# Patient Record
Sex: FEMALE | Race: WHITE | NOT HISPANIC OR LATINO | Employment: OTHER | URBAN - METROPOLITAN AREA
[De-identification: names, ages, dates, MRNs, and addresses within clinical notes are randomized per-mention and may not be internally consistent; named-entity substitution may affect disease eponyms.]

---

## 2017-05-12 ENCOUNTER — HOSPITAL ENCOUNTER (EMERGENCY)
Facility: OTHER | Age: 67
Discharge: HOME | End: 2017-05-12
Attending: EMERGENCY MEDICINE
Payer: COMMERCIAL

## 2017-05-12 VITALS
OXYGEN SATURATION: 90 % | HEART RATE: 77 BPM | DIASTOLIC BLOOD PRESSURE: 69 MMHG | BODY MASS INDEX: 29.23 KG/M2 | HEIGHT: 63 IN | SYSTOLIC BLOOD PRESSURE: 141 MMHG | WEIGHT: 165 LBS | RESPIRATION RATE: 18 BRPM

## 2017-05-12 DIAGNOSIS — L03.115 CELLULITIS OF RIGHT LOWER EXTREMITY: ICD-10-CM
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
## 2017-05-12 LAB — POCT GLUCOSE: 168 MG/DL (ref 70–110)

## 2017-05-12 PROCEDURE — 99284 EMERGENCY DEPT VISIT MOD MDM: CPT

## 2017-05-12 RX ORDER — MUPIROCIN 20 MG/G
OINTMENT TOPICAL 3 TIMES DAILY
Qty: 1 TUBE | Refills: 0 | Status: SHIPPED | OUTPATIENT
Start: 2017-05-12 | End: 2017-05-22

## 2017-05-12 RX ORDER — IBUPROFEN 600 MG/1
600 TABLET ORAL EVERY 6 HOURS PRN
Qty: 20 TABLET | Refills: 0 | Status: SHIPPED | OUTPATIENT
Start: 2017-05-12 | End: 2019-07-23

## 2017-05-12 RX ORDER — DIPHENHYDRAMINE HCL 25 MG
25 CAPSULE ORAL EVERY 6 HOURS PRN
Qty: 20 CAPSULE | Refills: 0 | Status: SHIPPED | OUTPATIENT
Start: 2017-05-12 | End: 2020-07-17

## 2017-05-12 RX ORDER — CLINDAMYCIN HYDROCHLORIDE 150 MG/1
300 CAPSULE ORAL EVERY 6 HOURS
Qty: 80 CAPSULE | Refills: 0 | Status: SHIPPED | OUTPATIENT
Start: 2017-05-12 | End: 2017-05-22

## 2017-05-12 NOTE — ED AVS SNAPSHOT
[facility] EMERGENCY DEPARTMENT  [address]  [facility] [address]               Aleksandra Hagen   2017  5:36 PM   ED    Description:  Female : 1950   Department:  [facility] Emergency Department           Your Care was Coordinated By:     Provider Role From To    Sydni Jacob DO Attending Provider 17 [phone] --      Reason for Visit     Insect Bite           Diagnoses this Visit        Comments    Insect bite, initial encounter    -  Primary     Cellulitis of right lower extremity           ED Disposition     ED Disposition Condition Comment    Discharge             To Do List           Follow-up Information     Follow up with Akbar Meier NP. Schedule an appointment as soon as possible for a visit in 2 days.    Specialty:  Internal Medicine    Contact information:    [address] [facility]  [facility] [address]  [phone]          Follow up with [facility] Emergency Department.    Specialty:  Emergency Medicine    Why:  If symptoms worsen    Contact information:    [address]  [facility] [address]  [phone]       These Medications        Disp Refills Start End    clindamycin (CLEOCIN) 150 MG capsule 80 capsule 0 2017    Take 2 capsules (300 mg total) by mouth every 6 (six) hours. - Oral    Pharmacy: [facility] [address] Ph #: [phone]       mupirocin (BACTROBAN) 2 % ointment 1 Tube 0 2017    Apply topically 3 (three) times daily. - Topical (Top)    Pharmacy: [facility] - [facility] Nahomi Ward [address] Ph #: [phone]       ibuprofen (ADVIL,MOTRIN) 600 MG tablet 20 tablet 0 2017     Take 1 tablet (600 mg total) by mouth every 6 (six) hours as needed for Pain. - Oral    Pharmacy: [facility] Nahomi Ward, [address] Ph #: [phone]       diphenhydrAMINE (BENADRYL) 25 mg capsule 20 capsule 0 2017     Take 1 each (25 mg  total) by mouth every 6 (six) hours as needed for Itching or Allergies. - Oral    Pharmacy: [facility] - [place], [place] - [address] [facility] #: [phone]         [facility] On Call     [facility] On Call Nurse Care Line - 24/7 Assistance  Unless otherwise directed by your provider, please contact [facility], our nurse care line that is available for 24/7 assistance.     Registered nurses in the [facility] provide: appointment scheduling, clinical advisement, health education, and other advisory services.  Call: [phone] (toll free)               Medications           Message regarding Medications     Verify the changes and/or additions to your medication regime listed below are the same as discussed with your clinician today.  If any of these changes or additions are incorrect, please notify your healthcare provider.        START taking these NEW medications        Refills    clindamycin (CLEOCIN) 150 MG capsule 0    Sig: Take 2 capsules (300 mg total) by mouth every 6 (six) hours.    Class: Print    Route: Oral    mupirocin (BACTROBAN) 2 % ointment 0    Sig: Apply topically 3 (three) times daily.    Class: Print    Route: Topical (Top)    ibuprofen (ADVIL,MOTRIN) 600 MG tablet 0    Sig: Take 1 tablet (600 mg total) by mouth every 6 (six) hours as needed for Pain.    Class: Print    Route: Oral    diphenhydrAMINE (BENADRYL) 25 mg capsule 0    Sig: Take 1 each (25 mg total) by mouth every 6 (six) hours as needed for Itching or Allergies.    Class: Print    Route: Oral      STOP taking these medications     omeprazole (PRILOSEC) 40 MG capsule Take 40 mg by mouth once daily.           Verify that the below list of medications is an accurate representation of the medications you are currently taking.  If none reported, the list may be blank. If incorrect, please contact your healthcare provider. Carry this list with you in case of emergency.           Current Medications      amlodipine (NORVASC) 5 MG tablet Take 5 mg by mouth once daily.    calcium carbonate (OS-ALISSA) 600 mg (1,500 mg) Tab Take 600 mg by mouth once daily.    citalopram (CELEXA) 40 MG tablet Take 40 mg by mouth once daily.    cyanocobalamin, vitamin B-12, 2,500 mcg Subl Place under the tongue.    fenofibrate 160 MG Tab Take 160 mg by mouth once daily.    furosemide (LASIX) 40 MG tablet Take 40 mg by mouth once daily.    gabapentin (NEURONTIN) 600 MG tablet     HUMALOG 100 unit/mL injection     metoprolol succinate (TOPROL-XL) 50 MG 24 hr tablet Take 50 mg by mouth 2 (two) times daily.    multivitamin capsule Take 1 capsule by mouth once daily.    ONETOUCH ULTRA TEST Strp     potassium gluconate 595 mg (99 mg) Tab Take by mouth once daily.    pravastatin (PRAVACHOL) 40 MG tablet Take 40 mg by mouth once daily.    ramipril (ALTACE) 5 MG capsule Take 5 mg by mouth once daily.    vitamin D 1000 units Tab Take 2,000 Units by mouth 2 (two) times daily.    albuterol 90 mcg/actuation inhaler Inhale 2 puffs into the lungs every 6 (six) hours as needed for Wheezing.    beclomethasone (QVAR) 80 mcg/actuation Aero Inhale 1 puff into the lungs 2 (two) times daily. Rinse mouth after each use.    clindamycin (CLEOCIN) 150 MG capsule Take 2 capsules (300 mg total) by mouth every 6 (six) hours.    colestipol (COLESTID) 1 gram Tab Take 1 g by mouth 2 (two) times daily.    diphenhydrAMINE (BENADRYL) 25 mg capsule Take 1 each (25 mg total) by mouth every 6 (six) hours as needed for Itching or Allergies.    fexofenadine (ALLEGRA) 180 MG tablet Take 1 tablet (180 mg total) by mouth once daily.    fluticasone (FLONASE) 50 mcg/actuation nasal spray 1 spray by Each Nare route once daily.    ibuprofen (ADVIL,MOTRIN) 600 MG tablet Take 1 tablet (600 mg total) by mouth every 6 (six) hours as needed for Pain.    mupirocin (BACTROBAN) 2 % ointment Apply topically 3 (three) times daily.           Clinical Reference Information           Your Vitals  "Were     BP Pulse Resp Height Weight SpO2    141/69 (BP Location: Left arm, Patient Position: Sitting) 77 18 5' 3" (1.6 m) 74.8 kg (165 lb) 90%    BMI                29.23 kg/m2          Allergies as of 5/12/2017        Reactions    Codeine Itching    burning      Immunizations Administered on Date of Encounter - 5/12/2017     None      ED Micro, Lab, POCT     None      ED Imaging Orders     None        Discharge Instructions         Insect, Spider, and Scorpion Bites and Stings  Most insect bites are harmless and cause only minor swelling or itching. But if youre allergic to insects such as wasps or bees, a sting can cause a life-threatening allergic reaction. The venom (poison) from scorpions and certain spiders can also be deadly, although this is rare. Knowing when to seek emergency care could save your life.     The black  (top) and brown recluse (bottom) are two poisonous spiders found in the [place].   When to go to the emergency room (ER)  Call 911 right away for any:  · Scorpion sting  · Bite from a black, red, or brown  spider or brown recluse spider  · Signs of an allergic reaction such as:  ¨ Hives  ¨ Swelling of your eyes, lips, or the inside of your throat  ¨ Trouble breathing  ¨ Dizziness or confusion  What to expect in the ER  · If youre having trouble breathing, youll be given oxygen through a mask. In case of severe breathing difficulty, you may have a tube inserted in your throat and be placed on a ventilator (breathing machine).  · If you are having a severe allergic reaction from a sting (called anaphylaxis), you may be given a shot of epinephrine. If it is known that you are allergic to bee or wasp stings, your doctor may give you a prescription for an "epi-pen" that you can keep with you at all times in case of a sting.  · You may receive antivenin (a substance that reverses the effects of poison) for some spider bites and scorpion stings. Because antivenin can sometimes " cause other problems, your doctor will weigh the risks and benefits of this treatment.  · Steroids such as prednisone are often used to treat allergic reactions. In many cases, your doctor will prescribe an antihistamine to help relieve itching.  Easing symptoms of an insect bite or sting  · Try to remove a stinger you can see. Use your fingernail, a knife edge, or credit card to scrape against the skin. Do not squeeze or pull.  · Apply ice or a cold compress to reduce pain and swelling (keep a thin cloth between the cold source and the skin).   Date Last Reviewed: 11/20/2014 © 2000-2016 [employer]. [address] [place] [address], [place] [address]. All rights reserved. This information is not intended as a substitute for professional medical care. Always follow your healthcare professional's instructions.          Discharge Instructions for Cellulitis  You have been diagnosed with cellulitis. This is an infection in the deepest layer of the skin. In some cases, the infection also affects the muscle. Cellulitis is caused by bacteria. The bacteria can enter the body through broken skin. This can happen with a cut, scratch, animal bite, or an insect bite that has been scratched. You may have been treated in the hospital with antibiotics and fluids. You will likely be given a prescription for antibiotics to take at home. This sheet will help you take care of yourself at home.  Home care  When you are home:  · Take the prescribed antibiotic medicine you are given as directed until it is gone. Take it even if you feel better. It treats the infection and stops it from returning. Not taking all the medicine can make future infections hard to treat.  · Keep the infected area clean.  · When possible, raise the infected area above the level of your heart. This helps keep swelling down.  · Talk with your healthcare provider if you are in pain. Ask what kind of over-the-counter medicine you can take for pain.  · Apply  clean bandages as advised.  · Take your temperature once a day for a week.  · Wash your hands often to prevent spreading the infection.  In the future, wash your hands before and after you touch cuts, scratches, or bandages. This will help prevent infection.   When to call your healthcare provider  Call your healthcare provider immediately if you have any of the following:  · Difficulty or pain when moving the joints above or below the infected area  · Discharge or pus draining from the area  · Fever of 100.4°F (38°C) or higher, or as directed by your healthcare provider  · Pain that gets worse in or around the infected   · Redness that gets worse in or around the infected area, particularly if the area of redness expands to a wider area  · Shaking chills  · Swelling of the infected area  · Vomiting   Date Last Reviewed: 8/1/2016  © 3790-9746 [employer]. [address] [place] [address]. All rights reserved. This information is not intended as a substitute for professional medical care. Always follow your healthcare professional's instructions.          [facility] Sign-Up     Activating your [facility] account is as easy as 1-2-3!     1) Visit [email or web address], select Sign Up Now, enter this activation code and your date of birth, then select Next.  [email or web address]  Expires: 6/26/2017  6:00 PM      2) Create a username and password to use when you visit [facility] in the future and select a security question in case you lose your password and select Next.    3) Enter your e-mail address and click Sign Up!    Additional Information  If you have questions, please e-mail [email or web address] or call [phone] to talk to our [facility] staff. Remember, [facility] is NOT to be used for urgent needs. For medical emergencies, dial 911.          [facility] Emergency Department complies with applicable Federal civil rights laws and does not discriminate on the basis of race, color, national origin,  age, disability, or sex.        Language Assistance Services     ATTENTION: Language assistance services are available, free of charge. Please call [phone].      ATENCIÓN: Si habla sarahañol, tiene a green disposición servicios gratuitos de asistencia lingüística. Llame al [phone].     CHÚ Ý: N?u b?n nói Ti?ng Vi?t, có các d?ch v? h? tr? ngôn ng? mi?n phí dành cho b?n. G?i s? [phone].

## 2017-05-12 NOTE — ED NOTES
Pt identifiers checked and correct.    LOC: The patient is awake, alert and aware of environment with an appropriate affect, the patient is oriented x 3 and speaking appropriately.   APPEARANCE: Patient resting comfortably and in no acute distress, patient is clean and well groomed, patient's clothing is properly fastened.   SKIN: The skin is warm and dry, color consistent with ethnicity, patient has normal skin turgor and moist mucus membranes, 1/2cm healing w/ scab wound R inner LE  MUSCULOSKELETAL: Patient moving all extremities spontaneously, no obvious swelling or deformities noted.   RESPIRATORY: Airway is open and patent, respirations are spontaneous, patient has a normal effort and rate, no accessory muscle use noted.   CARDIAC: Patient has a normal rate and regular rhythm, no periphreal edema noted, capillary refill < 3 seconds.   ABDOMEN: Soft and non tender to palpation, no distention noted, active bowel sounds present in all four quadrants.   NEUROLOGIC: PERRL, 3 mm bilaterally, eyes open spontaneously, behavior appropriate to situation, follows commands, facial expression symmetrical, bilateral hand grasp equal and even, purposeful motor response noted, normal sensation in all extremities when touched with a finger.

## 2017-05-12 NOTE — DISCHARGE INSTRUCTIONS
"  Insect, Spider, and Scorpion Bites and Stings  Most insect bites are harmless and cause only minor swelling or itching. But if youre allergic to insects such as wasps or bees, a sting can cause a life-threatening allergic reaction. The venom (poison) from scorpions and certain spiders can also be deadly, although this is rare. Knowing when to seek emergency care could save your life.     The black  (top) and brown recluse (bottom) are two poisonous spiders found in the [place].   When to go to the emergency room (ER)  Call 911 right away for any:  · Scorpion sting  · Bite from a black, red, or brown  spider or brown recluse spider  · Signs of an allergic reaction such as:  ¨ Hives  ¨ Swelling of your eyes, lips, or the inside of your throat  ¨ Trouble breathing  ¨ Dizziness or confusion  What to expect in the ER  · If youre having trouble breathing, youll be given oxygen through a mask. In case of severe breathing difficulty, you may have a tube inserted in your throat and be placed on a ventilator (breathing machine).  · If you are having a severe allergic reaction from a sting (called anaphylaxis), you may be given a shot of epinephrine. If it is known that you are allergic to bee or wasp stings, your doctor may give you a prescription for an "epi-pen" that you can keep with you at all times in case of a sting.  · You may receive antivenin (a substance that reverses the effects of poison) for some spider bites and scorpion stings. Because antivenin can sometimes cause other problems, your doctor will weigh the risks and benefits of this treatment.  · Steroids such as prednisone are often used to treat allergic reactions. In many cases, your doctor will prescribe an antihistamine to help relieve itching.  Easing symptoms of an insect bite or sting  · Try to remove a stinger you can see. Use your fingernail, a knife edge, or credit card to scrape against the skin. Do not squeeze or " pull.  · Apply ice or a cold compress to reduce pain and swelling (keep a thin cloth between the cold source and the skin).   Date Last Reviewed: 11/20/2014 © 2000-2016 [employer]. [address] [place] [address], [place] [address]. All rights reserved. This information is not intended as a substitute for professional medical care. Always follow your healthcare professional's instructions.          Discharge Instructions for Cellulitis  You have been diagnosed with cellulitis. This is an infection in the deepest layer of the skin. In some cases, the infection also affects the muscle. Cellulitis is caused by bacteria. The bacteria can enter the body through broken skin. This can happen with a cut, scratch, animal bite, or an insect bite that has been scratched. You may have been treated in the hospital with antibiotics and fluids. You will likely be given a prescription for antibiotics to take at home. This sheet will help you take care of yourself at home.  Home care  When you are home:  · Take the prescribed antibiotic medicine you are given as directed until it is gone. Take it even if you feel better. It treats the infection and stops it from returning. Not taking all the medicine can make future infections hard to treat.  · Keep the infected area clean.  · When possible, raise the infected area above the level of your heart. This helps keep swelling down.  · Talk with your healthcare provider if you are in pain. Ask what kind of over-the-counter medicine you can take for pain.  · Apply clean bandages as advised.  · Take your temperature once a day for a week.  · Wash your hands often to prevent spreading the infection.  In the future, wash your hands before and after you touch cuts, scratches, or bandages. This will help prevent infection.   When to call your healthcare provider  Call your healthcare provider immediately if you have any of the following:  · Difficulty or pain when moving the joints above  or below the infected area  · Discharge or pus draining from the area  · Fever of 100.4°F (38°C) or higher, or as directed by your healthcare provider  · Pain that gets worse in or around the infected   · Redness that gets worse in or around the infected area, particularly if the area of redness expands to a wider area  · Shaking chills  · Swelling of the infected area  · Vomiting   Date Last Reviewed: 8/1/2016  © 1549-2151 [employer]. [address] [place] [address]. All rights reserved. This information is not intended as a substitute for professional medical care. Always follow your healthcare professional's instructions.

## 2017-05-12 NOTE — ED PROVIDER NOTES
Encounter Date: 5/12/2017       History     Chief Complaint   Patient presents with    Insect Bite     + redness and swelling to the right lower leg above the ankle . patient has a black area in the middle of it. Area is the size of a pimple     Review of patient's allergies indicates:   Allergen Reactions    Codeine Itching     burning     HPI Comments: Aleksandra Hagen is a 67 y.o. female who presents to the Emergency Department with  insect bite to right lower leg.  Injury happened last night.  This morning when she woke up she cleaned it plan a bike ointment on it but the redness pain and swelling is getting worse.    Patient is a 67 y.o. female presenting with the following complaint: leg pain. The history is provided by the patient.   Leg Pain    The incident occurred in the yard. Injury mechanism: Insect bite to right lower leg. The incident occurred yesterday. The pain is present in the right leg. The quality of the pain is described as throbbing. The pain is at a severity of 7/10. The pain has been intermittent since onset. Pertinent negatives include no numbness, no inability to bear weight, no muscle weakness, no loss of sensation and no tingling. She reports no foreign bodies present. The symptoms are aggravated by palpation. She has tried nothing for the symptoms. The treatment provided no relief.     Past Medical History:   Diagnosis Date    Arthritis     COPD (chronic obstructive pulmonary disease)     Diabetes mellitus, type 2     Hypertension      Past Surgical History:   Procedure Laterality Date    BACK SURGERY      EYE SURGERY      HYSTERECTOMY      KNEE SURGERY      TRIGGER FINGER RELEASE       History reviewed. No pertinent family history.  Social History   Substance Use Topics    Smoking status: Current Every Day Smoker    Smokeless tobacco: None    Alcohol use None     Review of Systems   Constitutional: Negative for fever.   HENT: Negative for sore throat.    Respiratory:  Negative for shortness of breath.    Cardiovascular: Negative for chest pain.   Gastrointestinal: Negative for nausea.   Genitourinary: Negative for dysuria.   Musculoskeletal: Negative for back pain.   Skin: Positive for rash and wound.   Neurological: Negative for tingling, weakness and numbness.   Hematological: Does not bruise/bleed easily.   All other systems reviewed and are negative.      Physical Exam   Initial Vitals   BP Pulse Resp Temp SpO2   05/12/17 1743 05/12/17 1743 05/12/17 1743 -- 05/12/17 1743   141/69 77 18  90 %     Physical Exam    Nursing note and vitals reviewed.  Constitutional: She appears well-developed and well-nourished. No distress.   HENT:   Head: Normocephalic and atraumatic.   Right Ear: External ear normal.   Left Ear: External ear normal.   Nose: Nose normal.   Mouth/Throat: Oropharynx is clear and moist.   Eyes: Conjunctivae and EOM are normal. Pupils are equal, round, and reactive to light. Right eye exhibits no discharge. Left eye exhibits no discharge.   Neck: Normal range of motion. Neck supple. No JVD present.   Cardiovascular: Normal rate, regular rhythm, normal heart sounds and intact distal pulses. Exam reveals no gallop and no friction rub.    No murmur heard.  Pulmonary/Chest: Breath sounds normal. No respiratory distress. She has no wheezes. She has no rhonchi. She has no rales. She exhibits no tenderness.   Abdominal: Soft. Bowel sounds are normal. She exhibits no distension. There is no tenderness. There is no rebound.   Musculoskeletal: Normal range of motion. She exhibits no edema or tenderness.   Lymphadenopathy:     She has no cervical adenopathy.   Neurological: She is alert and oriented to person, place, and time. She has normal strength and normal reflexes. She displays normal reflexes. No cranial nerve deficit or sensory deficit.   Skin: Skin is warm and dry. Rash noted. No abscess noted. There is erythema. No pallor.        Psychiatric: She has a normal mood  and affect.         ED Course   Procedures  Labs Reviewed - No data to display                            ED Course       CC insect bite with redness pain and swelling to right lower leg.  Injury occurred last night swelling got worse today.  DDx ALLERGIC reaction, insect bite, spider bite, abscess, and cellulitis  Treatment in the ED physical exam.  Patient nontoxic in appearance.  Discussed outpatient treatment plan which will include Benadryl, clindamycin, and Bactroban.  Patient is ALLERGIC to codeine so we'll give patient ibuprofen for pain.  Fill and take prescriptions as directed.  Answered questions and discussed discharge plan.  Return to the ED if symptoms worsen or do not improve.  Follow up with PCP in 2 days.    Clinical Impression:   The primary encounter diagnosis was Insect bite, initial encounter. A diagnosis of Cellulitis of right lower extremity was also pertinent to this visit.          Sydni Jacob DO  05/12/17 1176

## 2017-08-01 ENCOUNTER — TELEPHONE (OUTPATIENT)
Dept: SMOKING CESSATION | Facility: CLINIC | Age: 67
End: 2017-08-01

## 2017-08-03 ENCOUNTER — TELEPHONE (OUTPATIENT)
Dept: SMOKING CESSATION | Facility: CLINIC | Age: 67
End: 2017-08-03

## 2017-08-04 ENCOUNTER — TELEPHONE (OUTPATIENT)
Dept: SMOKING CESSATION | Facility: CLINIC | Age: 67
End: 2017-08-04

## 2017-08-04 NOTE — TELEPHONE ENCOUNTER
3rd attempt; 1 year telephone follow up regarding smoking cessation quit episode #1 . Will resolve this episode.

## 2018-03-08 ENCOUNTER — TELEPHONE (OUTPATIENT)
Dept: SMOKING CESSATION | Facility: CLINIC | Age: 68
End: 2018-03-08

## 2018-03-21 ENCOUNTER — TELEPHONE (OUTPATIENT)
Dept: SMOKING CESSATION | Facility: CLINIC | Age: 68
End: 2018-03-21

## 2019-06-27 ENCOUNTER — TELEPHONE (OUTPATIENT)
Dept: GASTROENTEROLOGY | Facility: CLINIC | Age: 69
End: 2019-06-27

## 2019-06-27 NOTE — TELEPHONE ENCOUNTER
Harriet,  Patient called to confirm July 11 appt at 9am  Pt was scheduled for 8/6 and needed to be rescheduled  Please schedule pt and advise staff

## 2019-07-17 ENCOUNTER — TELEPHONE (OUTPATIENT)
Dept: ENDOSCOPY | Facility: HOSPITAL | Age: 69
End: 2019-07-17

## 2019-07-17 NOTE — TELEPHONE ENCOUNTER
----- Message from Janet Cooley sent at 7/17/2019  3:17 PM CDT -----  Contact: Self- [phone]  Mateo- pt called to reschedule 8/6 or 7/11 np appt that was canceled- pt left message this morning still waiting on a call back- please contact pt at [phone]

## 2019-07-23 ENCOUNTER — OFFICE VISIT (OUTPATIENT)
Dept: GASTROENTEROLOGY | Facility: CLINIC | Age: 69
End: 2019-07-23
Payer: COMMERCIAL

## 2019-07-23 VITALS
HEIGHT: 62 IN | WEIGHT: 145.5 LBS | BODY MASS INDEX: 26.78 KG/M2 | SYSTOLIC BLOOD PRESSURE: 146 MMHG | HEART RATE: 87 BPM | DIASTOLIC BLOOD PRESSURE: 67 MMHG

## 2019-07-23 DIAGNOSIS — K52.9 CHRONIC DIARRHEA: ICD-10-CM

## 2019-07-23 DIAGNOSIS — R16.0 HEPATOMEGALY: Primary | ICD-10-CM

## 2019-07-23 PROCEDURE — 1101F PR PT FALLS ASSESS DOC 0-1 FALLS W/OUT INJ PAST YR: ICD-10-PCS | Mod: CPTII,S$GLB,, | Performed by: INTERNAL MEDICINE

## 2019-07-23 PROCEDURE — 99204 PR OFFICE/OUTPT VISIT, NEW, LEVL IV, 45-59 MIN: ICD-10-PCS | Mod: S$GLB,,, | Performed by: INTERNAL MEDICINE

## 2019-07-23 PROCEDURE — 99204 OFFICE O/P NEW MOD 45 MIN: CPT | Mod: S$GLB,,, | Performed by: INTERNAL MEDICINE

## 2019-07-23 PROCEDURE — 99999 PR PBB SHADOW E&M-EST. PATIENT-LVL III: CPT | Mod: PBBFAC,,, | Performed by: INTERNAL MEDICINE

## 2019-07-23 PROCEDURE — 1101F PT FALLS ASSESS-DOCD LE1/YR: CPT | Mod: CPTII,S$GLB,, | Performed by: INTERNAL MEDICINE

## 2019-07-23 PROCEDURE — 99999 PR PBB SHADOW E&M-EST. PATIENT-LVL III: ICD-10-PCS | Mod: PBBFAC,,, | Performed by: INTERNAL MEDICINE

## 2019-07-23 RX ORDER — DIPHENOXYLATE HYDROCHLORIDE AND ATROPINE SULFATE 2.5; .025 MG/1; MG/1
1 TABLET ORAL 3 TIMES DAILY PRN
Qty: 60 TABLET | Refills: 0 | Status: SHIPPED | OUTPATIENT
Start: 2019-07-23 | End: 2019-11-15 | Stop reason: SDUPTHER

## 2019-07-23 RX ORDER — VALSARTAN 80 MG/1
160 TABLET ORAL DAILY
COMMUNITY
End: 2021-03-16

## 2019-07-23 RX ORDER — HYDROCODONE BITARTRATE AND ACETAMINOPHEN 5; 325 MG/1; MG/1
1 TABLET ORAL EVERY 6 HOURS PRN
COMMUNITY
End: 2020-07-17

## 2019-07-23 RX ORDER — INSULIN LISPRO 100 [IU]/ML
INJECTION, SOLUTION INTRAVENOUS; SUBCUTANEOUS
COMMUNITY
End: 2019-08-12

## 2019-07-23 RX ORDER — DICYCLOMINE HYDROCHLORIDE 10 MG/1
10 CAPSULE ORAL
COMMUNITY
End: 2020-09-08

## 2019-07-23 RX ORDER — ROSUVASTATIN CALCIUM 20 MG/1
20 TABLET, COATED ORAL DAILY
COMMUNITY
End: 2021-06-30

## 2019-07-23 RX ORDER — SUCRALFATE 1 G/1
1 TABLET ORAL 2 TIMES DAILY
Qty: 60 TABLET | Refills: 1 | Status: SHIPPED | OUTPATIENT
Start: 2019-07-23 | End: 2019-08-12

## 2019-07-23 RX ORDER — CHOLECALCIFEROL (VITAMIN D3) 125 MCG
5000 CAPSULE ORAL
COMMUNITY
End: 2020-09-08

## 2019-07-23 NOTE — PROGRESS NOTES
Reason for visit:  Upper abdominal pain and diarrhea    HPI:   this is a 69-year-old who has been under the care of multiple prior gastroenterologists including ,  and  for chronic diarrhea.  Prior evaluations have all been negative.  She describes 5-6 loose bowel movements a day will get occasional mucus.  Her last colonoscopy 2017 revealed 2 tubular adenomas were resected.  Has never been any documentation of colitis.  She has undergone cholecystectomy and most recently has been on Gleevec for CML which both could be exacerbating factors.  She has been tested for antral pathogens in the past and have been negative as well. Most recently she was in the emergency room with abdominal pain and CT imaging revealed nodular liver with hepatomegaly.  She does complain of intermittent heartburn which is controlled with omeprazole OTC.  She is not on it daily because of chronic kidney disease. She suspects that her liver disease may have been due to hepatitis B although prior serologies have been negative.  There is suspicion that she may have STREET cirrhosis.    Past medical, surgical, social and family history reviewed in epic    Medication allergies reviewed in epic    Review of systems:  Constitutional:  No fever, no chills, no weight loss, appetite is stable  Eyes:  No visual changes or red eyes  ENT:  No odynophagia or hoarseness of voice  Cardiovascular:  No angina or palpitation  Respiratory:  No shortness of breath or wheezing  Genitourinary:  No dysuria or frequency  Musculoskeletal: arthralgias  Skin:   Pruritus, no eczema  Neurologic:  No headaches or seizures  Psychiatric:  No anxiety depression  Gastrointestinal:  See HPI    Physical exam:  Vitals see epic, awake alert oriented x3 in no acute distress  Neck:  Carotid bruit on the right side, no cervical adenopathy  Abdomen:  Soft, slightly obese, tender hepatomegaly 3 fingerbreadth below the right costal margin, bowel sounds  are normal, no abdominal bruits heard, no splenomegaly detected  Eyes:  Conjunctivae anicteric, not injected   ENT:  Oral mucosa moist  Cardiovascular:  S1, S2 normal, no murmurs or gallops  Respiratory:  Bilateral air entry equal with no rhonchi or crackles  Skin:  No palmar erythema or spider angiomata  Neurologic:  No asterixis or tremors  Psychiatric:  Affect appropriate  Lower extremities:  No pedal edema    Prior labs and imaging reviewed:  Normal cardiac function no evidence of right heart failure; CT imaging revealing nodular hepatomegaly    Impression:  1.  Tender hepatomegaly  2.  Chronic diarrhea  3.  Chronic gastro esophageal reflux    Recommendation:  1.  Trial of Carafate 1 g p.o. b.i.d.  2.  Lomotil 1 tablet 3 times a day p.r.n. diarrhea  3.  Hepatology consult for tender hepatomegaly  4.  Follow-up in GI Clinic in 2 months, may consider flex sig with biopsies to rule out microscopic colitis.

## 2019-08-12 ENCOUNTER — OFFICE VISIT (OUTPATIENT)
Dept: HEPATOLOGY | Facility: CLINIC | Age: 69
End: 2019-08-12
Payer: COMMERCIAL

## 2019-08-12 ENCOUNTER — PROCEDURE VISIT (OUTPATIENT)
Dept: HEPATOLOGY | Facility: CLINIC | Age: 69
End: 2019-08-12
Attending: INTERNAL MEDICINE
Payer: COMMERCIAL

## 2019-08-12 VITALS
HEIGHT: 62 IN | WEIGHT: 141.75 LBS | SYSTOLIC BLOOD PRESSURE: 119 MMHG | TEMPERATURE: 100 F | OXYGEN SATURATION: 95 % | RESPIRATION RATE: 18 BRPM | HEART RATE: 77 BPM | DIASTOLIC BLOOD PRESSURE: 56 MMHG | BODY MASS INDEX: 26.09 KG/M2

## 2019-08-12 DIAGNOSIS — K74.60 LIVER CIRRHOSIS SECONDARY TO NASH: ICD-10-CM

## 2019-08-12 DIAGNOSIS — K75.81 LIVER CIRRHOSIS SECONDARY TO NASH: ICD-10-CM

## 2019-08-12 DIAGNOSIS — R76.8 POSITIVE HEPATITIS C ANTIBODY TEST: Primary | ICD-10-CM

## 2019-08-12 PROBLEM — I77.9 CAROTID ARTERIAL DISEASE: Status: ACTIVE | Noted: 2019-03-14

## 2019-08-12 PROBLEM — C92.10 CML (CHRONIC MYELOCYTIC LEUKEMIA): Status: ACTIVE | Noted: 2018-10-20

## 2019-08-12 PROCEDURE — 99999 PR PBB SHADOW E&M-EST. PATIENT-LVL V: ICD-10-PCS | Mod: PBBFAC,,, | Performed by: INTERNAL MEDICINE

## 2019-08-12 PROCEDURE — 1101F PR PT FALLS ASSESS DOC 0-1 FALLS W/OUT INJ PAST YR: ICD-10-PCS | Mod: CPTII,S$GLB,, | Performed by: INTERNAL MEDICINE

## 2019-08-12 PROCEDURE — 1101F PT FALLS ASSESS-DOCD LE1/YR: CPT | Mod: CPTII,S$GLB,, | Performed by: INTERNAL MEDICINE

## 2019-08-12 PROCEDURE — 99999 PR PBB SHADOW E&M-EST. PATIENT-LVL V: CPT | Mod: PBBFAC,,, | Performed by: INTERNAL MEDICINE

## 2019-08-12 PROCEDURE — 99204 OFFICE O/P NEW MOD 45 MIN: CPT | Mod: S$GLB,,, | Performed by: INTERNAL MEDICINE

## 2019-08-12 PROCEDURE — 99204 PR OFFICE/OUTPT VISIT, NEW, LEVL IV, 45-59 MIN: ICD-10-PCS | Mod: S$GLB,,, | Performed by: INTERNAL MEDICINE

## 2019-08-12 PROCEDURE — 91200 PR LIVER ELASTOGRAPHY W/OUT IMAG W/INTERP & REPORT: ICD-10-PCS | Mod: S$GLB,,, | Performed by: INTERNAL MEDICINE

## 2019-08-12 PROCEDURE — 91200 LIVER ELASTOGRAPHY: CPT | Mod: S$GLB,,, | Performed by: INTERNAL MEDICINE

## 2019-08-12 RX ORDER — GABAPENTIN 600 MG/1
600 TABLET ORAL 3 TIMES DAILY
COMMUNITY
End: 2020-09-22 | Stop reason: SDUPTHER

## 2019-08-12 RX ORDER — ESCITALOPRAM OXALATE 20 MG/1
20 TABLET ORAL DAILY
COMMUNITY
End: 2022-01-01

## 2019-08-12 NOTE — PROCEDURES
Procedures     Fibroscan Procedure     Name: Aleksandra Hagen  Date of Procedure : 2019    :: Soraida Krause MD  Diagnosis: NAFLD    Probe: M    Fibroscan readin.7 KPa    Fibrosis:F4     CAP readin dB/m    Steatosis: :S1

## 2019-08-12 NOTE — LETTER
August 12, 2019      Josafat Galindo MD  [address]  [facility] [address]           [facility] - Hepatology  [address]  [facility] [address]  Phone: [phone]  Fax: [phone]          Patient: Aleksandra Hagen   MR Number: [id number]   YOB: 1950   Date of Visit: 8/12/2019       Dear Dr. Josafat Galindo:    Thank you for referring Aleksandra Hagen to me for evaluation. Attached you will find relevant portions of my assessment and plan of care.    If you have questions, please do not hesitate to call me. I look forward to following Aleksandra Hagen along with you.    Sincerely,    Soraida Krause MD    Enclosure  CC:  No Recipients    If you would like to receive this communication electronically, please contact [email or web address] or [phone] to request more information on [employer] Link access.    For providers and/or their staff who would like to refer a patient to [facility], please contact us through our one-stop-shop provider referral line, [facility], at [phone].    If you feel you have received this communication in error or would no longer like to receive these types of communications, please e-mail [email or web address]

## 2019-08-12 NOTE — PROGRESS NOTES
Hepatology Consult Note    Referring provider: Dr. Josafat Galindo    Chief complaint: STREET cirrhosis    HPI:  Aleksandra Hagen is a 69 y.o. female that presents to hepatology clinic for consultation of fatty liver, hepatomegaly.  She is alone.    Reports diagnosis of liver disease many years ago.  She is not sure of the underlying cause but provides notes with STREET cirrhosis.  Risk factors for STREET present with DM, hypertension, hyperlipidemia  Reported liver biopsy from  but patient cannot recall undergoing procedure.      Diagnosed with cirrhosis: patient is unsure of diagnosis but as stated above, prior reported liver biopsy in  and recent imaging consistent with cirrhosis and portal hypertension  Presenting symptom/s: compensated cirrhosis   Current symptoms of portal hypertension:   Ascites: present on CT imaging but patient not clinically aware of ascites, currently on lasix    LE edema: present   HE: none    GI bleeding: none   Jaundice: none     Cirrhosis HCM:  Hepatitis A vaccination: non-immune  Hepatitis B vaccination: immunity  HCC screening: CT 2019 - no focal lesions  Variceal screenin2017 - varices   Pneumococcal vaccination: unsure   Influenza vaccination: 2017  Colonoscopy:  with no polyps    Past Medical History:   Diagnosis Date    Arthritis     COPD (chronic obstructive pulmonary disease)     Diabetes mellitus, type 2     Hypertension    CML diagnosed 2 years ago, currently on Gleevec    Past Surgical History:   Procedure Laterality Date    BACK SURGERY      EYE SURGERY      HYSTERECTOMY      KNEE SURGERY      TRIGGER FINGER RELEASE         Family History   Problem Relation Age of Onset    Colon cancer Neg Hx     Esophageal cancer Neg Hx        Social History     Socioeconomic History    Marital status:      Spouse name: Not on file    Number of children: Not on file    Years of education: Not on file    Highest education level: Not on file    Occupational History    Not on file   Social Needs    Financial resource strain: Not on file    Food insecurity:     Worry: Not on file     Inability: Not on file    Transportation needs:     Medical: Not on file     Non-medical: Not on file   Tobacco Use    Smoking status: Current Every Day Smoker   Substance and Sexual Activity    Alcohol use: No     Alcohol/week: 0.0 oz    Drug use: Not on file    Sexual activity: Not on file   Lifestyle    Physical activity:     Days per week: Not on file     Minutes per session: Not on file    Stress: Not on file   Relationships    Social connections:     Talks on phone: Not on file     Gets together: Not on file     Attends [employer] service: Not on file     Active member of club or organization: Not on file     Attends meetings of clubs or organizations: Not on file     Relationship status: Not on file   Other Topics Concern    Not on file   Social History Narrative    Not on file   no significant alcohol     Current Outpatient Medications   Medication Sig Dispense Refill    albuterol 90 mcg/actuation inhaler Inhale 2 puffs into the lungs every 6 (six) hours as needed for Wheezing. 18 g 0    beclomethasone (QVAR) 80 mcg/actuation Aero Inhale 1 puff into the lungs 2 (two) times daily. Rinse mouth after each use. 1 each 6    biotin 5,000 mcg TbDL Take 5,000 mg by mouth.      calcium carbonate (OS-ALISSA) 600 mg (1,500 mg) Tab Take 600 mg by mouth once daily.      cyanocobalamin, vitamin B-12, 2,500 mcg Subl Place under the tongue.      dicyclomine (BENTYL) 10 MG capsule Take 10 mg by mouth 4 (four) times daily before meals and nightly.      diphenhydrAMINE (BENADRYL) 25 mg capsule Take 1 each (25 mg total) by mouth every 6 (six) hours as needed for Itching or Allergies. 20 capsule 0    fexofenadine (ALLEGRA) 180 MG tablet Take 1 tablet (180 mg total) by mouth once daily. 30 tablet 0    furosemide (LASIX) 40 MG tablet Take 40 mg by mouth once daily.  6     HYDROcodone-acetaminophen (NORCO) 5-325 mg per tablet Take 1 tablet by mouth every 6 (six) hours as needed for Pain.      imatinib mesylate (IMATINIB ORAL) Take by mouth.      insulin lispro (HUMALOG U-100 INSULIN) 100 unit/mL injection Inject into the skin.      metoprolol succinate (TOPROL-XL) 50 MG 24 hr tablet Take 50 mg by mouth 2 (two) times daily.  6    multivitamin capsule Take 1 capsule by mouth once daily.      ONETOUCH ULTRA TEST Strp       potassium gluconate 595 mg (99 mg) Tab Take by mouth once daily.      ranitidine (ZANTAC) 150 MG tablet Take 150 mg by mouth 2 (two) times daily.      rosuvastatin (CRESTOR) 20 MG tablet Take 20 mg by mouth once daily.      sucralfate (CARAFATE) 1 gram tablet Take 1 tablet (1 g total) by mouth 2 (two) times daily. 60 tablet 1    valsartan (DIOVAN) 80 MG tablet Take 80 mg by mouth once daily.      vitamin D 1000 units Tab Take 2,000 Units by mouth 2 (two) times daily.       No current facility-administered medications for this visit.        Review of patient's allergies indicates:   Allergen Reactions    Codeine Itching     burning       Review of Systems   Constitutional: Positive for weight loss. Negative for chills, fever and malaise/fatigue.   Eyes: Negative.    Respiratory: Negative for cough and shortness of breath.    Cardiovascular: Positive for leg swelling. Negative for chest pain.   Gastrointestinal: Positive for heartburn. Negative for abdominal pain, nausea and vomiting.   Musculoskeletal: Negative for joint pain and myalgias.        Trigger finger with associated pain   Skin: Negative for itching and rash.   Neurological: Negative for dizziness, focal weakness and headaches.   Endo/Heme/Allergies: Does not bruise/bleed easily.   Psychiatric/Behavioral: Negative for depression.       Vitals:    08/12/19 1041   BP: (!) 119/56   Pulse: 77   Resp: 18   Temp: 99.6 °F (37.6 °C)   TempSrc: Oral   SpO2: 95%   Weight: 64.3 kg (141 lb 12.1 oz)   Height:  "5' 2" (1.575 m)       Physical Exam   Constitutional: She is oriented to person, place, and time. She appears well-developed and well-nourished. No distress.   HENT:   Head: Normocephalic and atraumatic.   Mouth/Throat: Oropharynx is clear and moist. No oropharyngeal exudate.   Eyes: Pupils are equal, round, and reactive to light. EOM are normal. No scleral icterus.   Neck: Normal range of motion. Neck supple. No thyromegaly present.   Cardiovascular: Normal rate, regular rhythm and normal heart sounds. Exam reveals no gallop and no friction rub.   No murmur heard.  Pulmonary/Chest: Effort normal. No respiratory distress. She has no wheezes. She has no rales.   Abdominal: Soft. Bowel sounds are normal. She exhibits no distension. There is no tenderness.   Musculoskeletal: Normal range of motion. She exhibits no edema.   Lymphadenopathy:     She has no cervical adenopathy.   Neurological: She is alert and oriented to person, place, and time. No cranial nerve deficit.   No asterixis   Skin: Skin is warm and dry. No rash noted.   Psychiatric: She has a normal mood and affect. Her behavior is normal.   Vitals reviewed.      External labs reviewed    Imaging: EMR and external imaging available reviewed   Fibroscan 8/12/2019: 44.7 kPa; 237 dB/m    Assessment:  69 y.o. female presenting for management of STREET cirrhosis.      Plan:  *  Positive hep C antibody from external labs; genotype ordered today.  Patient reports no history of hep C treatment.  She is unsure if she has been diagnosed with hep C in the past.  *  Full serologic work-up to exclude other etiologies of chronic liver disease  *  Discussed importance of risk factor modification for STREET including regular physical activity  *  Provided information regarding low sodium diet; reinforced high protein for management of liver disease.  She is taking protein bars and supplement  *  Fibroscan today confirms diagnosis of cirrhosis    HCM as documented above     RTC " in 6 months with US for same day

## 2019-08-12 NOTE — PATIENT INSTRUCTIONS
You have a history of cirrhosis, the most likely cause is related to fatty liver.    It is important to have good control of blood sugar, blood pressure and cholesterol as these are risk factors for fatty liver.    Recommend regular physical activity and healthy diet.    High protein and low salt diet in addition to low carbohydrates for diabetes.    Labs today to rule out other causes of liver disease including hepatitis C     Return to clinic in 6 months with US for same day

## 2019-08-23 DIAGNOSIS — D72.829 LEUKOCYTOSIS, UNSPECIFIED TYPE: Primary | ICD-10-CM

## 2019-08-26 ENCOUNTER — TELEPHONE (OUTPATIENT)
Dept: HEPATOLOGY | Facility: CLINIC | Age: 69
End: 2019-08-26

## 2019-08-26 NOTE — TELEPHONE ENCOUNTER
----- Message from Oralia Love sent at 8/26/2019 11:11 AM CDT -----  Contact: pt  Type:  Patient Returning Call    Who Called:pt  Who Left Message for Patient:arturo  Does the patient know what this is regarding?:results  Would the patient rather a call back or a response via [facility]? call  Best Call Back Number:[phone]  Additional Information:

## 2019-08-26 NOTE — TELEPHONE ENCOUNTER
----- Message from Soraida Krause MD sent at 8/23/2019  3:39 PM CDT -----  Your labs show that your liver disease is stable.  You do not have hepatitis C.  Your white blood cell count is elevated and I recommend rechecking next week

## 2019-10-01 ENCOUNTER — TELEPHONE (OUTPATIENT)
Dept: HEPATOLOGY | Facility: CLINIC | Age: 69
End: 2019-10-01

## 2019-10-08 ENCOUNTER — OFFICE VISIT (OUTPATIENT)
Dept: GASTROENTEROLOGY | Facility: CLINIC | Age: 69
End: 2019-10-08
Payer: COMMERCIAL

## 2019-10-08 VITALS
WEIGHT: 141.75 LBS | HEART RATE: 69 BPM | DIASTOLIC BLOOD PRESSURE: 63 MMHG | BODY MASS INDEX: 26.09 KG/M2 | HEIGHT: 62 IN | SYSTOLIC BLOOD PRESSURE: 124 MMHG

## 2019-10-08 DIAGNOSIS — R19.7 DIARRHEA, UNSPECIFIED TYPE: Primary | ICD-10-CM

## 2019-10-08 PROCEDURE — 99214 PR OFFICE/OUTPT VISIT, EST, LEVL IV, 30-39 MIN: ICD-10-PCS | Mod: S$GLB,,, | Performed by: INTERNAL MEDICINE

## 2019-10-08 PROCEDURE — 1101F PT FALLS ASSESS-DOCD LE1/YR: CPT | Mod: CPTII,S$GLB,, | Performed by: INTERNAL MEDICINE

## 2019-10-08 PROCEDURE — 99999 PR PBB SHADOW E&M-EST. PATIENT-LVL III: ICD-10-PCS | Mod: PBBFAC,,, | Performed by: INTERNAL MEDICINE

## 2019-10-08 PROCEDURE — 1101F PR PT FALLS ASSESS DOC 0-1 FALLS W/OUT INJ PAST YR: ICD-10-PCS | Mod: CPTII,S$GLB,, | Performed by: INTERNAL MEDICINE

## 2019-10-08 PROCEDURE — 99999 PR PBB SHADOW E&M-EST. PATIENT-LVL III: CPT | Mod: PBBFAC,,, | Performed by: INTERNAL MEDICINE

## 2019-10-08 PROCEDURE — 99214 OFFICE O/P EST MOD 30 MIN: CPT | Mod: S$GLB,,, | Performed by: INTERNAL MEDICINE

## 2019-10-08 NOTE — PROGRESS NOTES
Reason for visit:  Upper abdominal pain and diarrhea     HPI:   this is a 69-year-old who has been under the care of multiple prior gastroenterologists including ,  and  for chronic diarrhea.  Prior evaluations have all been negative.  She describes 5-6 loose bowel movements a day will get occasional mucus.  Her last colonoscopy 2017 revealed 2 tubular adenomas were resected.  Has never been any documentation of colitis.  She has undergone cholecystectomy and most recently has been on Gleevec for CML which both could be exacerbating factors.  She has been tested for enteral pathogens in the past and have been negative as well. Most recently she was in the emergency room with abdominal pain and CT imaging revealed nodular liver with hepatomegaly.  She does complain of intermittent heartburn which is controlled with omeprazole OTC.  She is not on it daily because of chronic kidney disease. She suspects that her liver disease may have been due to hepatitis B although prior serologies have been negative.  There is suspicion that she may have STREET cirrhosis.    After her recent visit with Dr. thomson and hepatology seems like she may have STREET cirrhosis.  She denies any major issues with diarrhea.  She was also 1 ring if coffee might be contributing to her diarrhea and she will experiment with limiting its use in the future.         Past medical, surgical, social and family history reviewed in epic     Medication allergies reviewed in epic     Review of systems:  Constitutional:  No fever, no chills, no weight loss, appetite is stable  Eyes:  No visual changes or red eyes  ENT:  No odynophagia or hoarseness of voice  Cardiovascular:  No angina or palpitation  Respiratory:  No shortness of breath or wheezing  Genitourinary:  No dysuria or frequency  Musculoskeletal: arthralgias  Skin:   Pruritus, no eczema  Neurologic:  No headaches or seizures  Psychiatric:  No anxiety  depression  Gastrointestinal:  See HPI     Physical exam:  Vitals see epic, awake alert oriented x3 in no acute distress  No physical exam done today, with about 25 min spent with the patient and more than 50% in counseling.     Prior labs and imaging reviewed:  Normal cardiac function no evidence of right heart failure; CT imaging revealing nodular hepatomegaly     Impression:  1.  Chronic diarrhea       Recommendation:  1.  Continue Lomotil 1 tablet 3 times a day p.r.n. diarrhea  3.  Hepatology follow-up every 6 months  4.  Follow-up in GI Clinic as needed, may consider flex sig with biopsies to rule out microscopic colitis if diarrhea worsens.

## 2019-10-09 ENCOUNTER — TELEPHONE (OUTPATIENT)
Dept: GASTROENTEROLOGY | Facility: CLINIC | Age: 69
End: 2019-10-09

## 2019-10-09 NOTE — TELEPHONE ENCOUNTER
----- Message from Emilee Alexander sent at 10/9/2019  2:11 PM CDT -----  Type:  Needs Medical Advice    Who Called: pt called to give Dr. Galindo the name of the heartburn medication she was taking.  It is Ranitidine 150mg  Would the patient rather a call back or a response via [facility]? call  Best Call Back Number: [phone]  Additional Information:

## 2019-11-15 RX ORDER — DIPHENOXYLATE HYDROCHLORIDE AND ATROPINE SULFATE 2.5; .025 MG/1; MG/1
TABLET ORAL
Qty: 60 TABLET | Refills: 1 | Status: SHIPPED | OUTPATIENT
Start: 2019-11-15 | End: 2020-09-08

## 2020-07-06 DIAGNOSIS — K75.81 LIVER CIRRHOSIS SECONDARY TO NASH: Primary | ICD-10-CM

## 2020-07-06 DIAGNOSIS — K74.60 LIVER CIRRHOSIS SECONDARY TO NASH: Primary | ICD-10-CM

## 2020-07-17 ENCOUNTER — LAB VISIT (OUTPATIENT)
Dept: LAB | Facility: HOSPITAL | Age: 70
End: 2020-07-17
Payer: COMMERCIAL

## 2020-07-17 ENCOUNTER — TELEPHONE (OUTPATIENT)
Dept: HEPATOLOGY | Facility: CLINIC | Age: 70
End: 2020-07-17

## 2020-07-17 ENCOUNTER — HOSPITAL ENCOUNTER (OUTPATIENT)
Dept: RADIOLOGY | Facility: HOSPITAL | Age: 70
Discharge: HOME | End: 2020-07-17
Attending: NURSE PRACTITIONER
Payer: COMMERCIAL

## 2020-07-17 ENCOUNTER — OFFICE VISIT (OUTPATIENT)
Dept: HEPATOLOGY | Facility: CLINIC | Age: 70
End: 2020-07-17
Payer: COMMERCIAL

## 2020-07-17 VITALS
DIASTOLIC BLOOD PRESSURE: 58 MMHG | HEIGHT: 62 IN | BODY MASS INDEX: 25.44 KG/M2 | OXYGEN SATURATION: 95 % | WEIGHT: 138.25 LBS | SYSTOLIC BLOOD PRESSURE: 124 MMHG | HEART RATE: 69 BPM

## 2020-07-17 DIAGNOSIS — K75.81 LIVER CIRRHOSIS SECONDARY TO NASH: ICD-10-CM

## 2020-07-17 DIAGNOSIS — K74.60 LIVER CIRRHOSIS SECONDARY TO NASH: ICD-10-CM

## 2020-07-17 DIAGNOSIS — I85.10 SECONDARY ESOPHAGEAL VARICES WITHOUT BLEEDING: ICD-10-CM

## 2020-07-17 DIAGNOSIS — K74.60 CIRRHOSIS OF LIVER WITHOUT ASCITES, UNSPECIFIED HEPATIC CIRRHOSIS TYPE: Primary | ICD-10-CM

## 2020-07-17 LAB
AFP SERPL-MCNC: 2.7 NG/ML (ref 0–8.4)
ALBUMIN SERPL BCP-MCNC: 3.8 G/DL (ref 3.5–5.2)
ALP SERPL-CCNC: 49 U/L (ref 55–135)
ALT SERPL W/O P-5'-P-CCNC: 18 U/L (ref 10–44)
ANION GAP SERPL CALC-SCNC: 9 MMOL/L (ref 8–16)
AST SERPL-CCNC: 32 U/L (ref 10–40)
BASOPHILS # BLD AUTO: 0.02 K/UL (ref 0–0.2)
BASOPHILS NFR BLD: 0.2 % (ref 0–1.9)
BILIRUB SERPL-MCNC: 0.9 MG/DL (ref 0.1–1)
BUN SERPL-MCNC: 17 MG/DL (ref 8–23)
CALCIUM SERPL-MCNC: 9.6 MG/DL (ref 8.7–10.5)
CHLORIDE SERPL-SCNC: 105 MMOL/L (ref 95–110)
CO2 SERPL-SCNC: 29 MMOL/L (ref 23–29)
CREAT SERPL-MCNC: 1.4 MG/DL (ref 0.5–1.4)
DIFFERENTIAL METHOD: ABNORMAL
EOSINOPHIL # BLD AUTO: 0.5 K/UL (ref 0–0.5)
EOSINOPHIL NFR BLD: 4.6 % (ref 0–8)
ERYTHROCYTE [DISTWIDTH] IN BLOOD BY AUTOMATED COUNT: 13.6 % (ref 11.5–14.5)
EST. GFR  (AFRICAN AMERICAN): 44.2 ML/MIN/1.73 M^2
EST. GFR  (NON AFRICAN AMERICAN): 38.4 ML/MIN/1.73 M^2
GLUCOSE SERPL-MCNC: 206 MG/DL (ref 70–110)
HCT VFR BLD AUTO: 35.3 % (ref 37–48.5)
HGB BLD-MCNC: 11.5 G/DL (ref 12–16)
IMM GRANULOCYTES # BLD AUTO: 0.05 K/UL (ref 0–0.04)
IMM GRANULOCYTES NFR BLD AUTO: 0.4 % (ref 0–0.5)
INR PPP: 1 (ref 0.8–1.2)
LYMPHOCYTES # BLD AUTO: 2.4 K/UL (ref 1–4.8)
LYMPHOCYTES NFR BLD: 21 % (ref 18–48)
MCH RBC QN AUTO: 34 PG (ref 27–31)
MCHC RBC AUTO-ENTMCNC: 32.6 G/DL (ref 32–36)
MCV RBC AUTO: 104 FL (ref 82–98)
MONOCYTES # BLD AUTO: 1 K/UL (ref 0.3–1)
MONOCYTES NFR BLD: 8.7 % (ref 4–15)
NEUTROPHILS # BLD AUTO: 7.4 K/UL (ref 1.8–7.7)
NEUTROPHILS NFR BLD: 65.1 % (ref 38–73)
NRBC BLD-RTO: 0 /100 WBC
PLATELET # BLD AUTO: 148 K/UL (ref 150–350)
PMV BLD AUTO: 9.7 FL (ref 9.2–12.9)
POTASSIUM SERPL-SCNC: 4.8 MMOL/L (ref 3.5–5.1)
PROT SERPL-MCNC: 6.7 G/DL (ref 6–8.4)
PROTHROMBIN TIME: 10.6 SEC (ref 9–12.5)
RBC # BLD AUTO: 3.38 M/UL (ref 4–5.4)
SODIUM SERPL-SCNC: 143 MMOL/L (ref 136–145)
WBC # BLD AUTO: 11.39 K/UL (ref 3.9–12.7)

## 2020-07-17 PROCEDURE — 99214 PR OFFICE/OUTPT VISIT, EST, LEVL IV, 30-39 MIN: ICD-10-PCS | Mod: S$GLB,,, | Performed by: NURSE PRACTITIONER

## 2020-07-17 PROCEDURE — 1126F AMNT PAIN NOTED NONE PRSNT: CPT | Mod: S$GLB,,, | Performed by: NURSE PRACTITIONER

## 2020-07-17 PROCEDURE — 76705 ECHO EXAM OF ABDOMEN: CPT | Mod: 26,,, | Performed by: RADIOLOGY

## 2020-07-17 PROCEDURE — 80053 COMPREHEN METABOLIC PANEL: CPT

## 2020-07-17 PROCEDURE — 1159F MED LIST DOCD IN RCRD: CPT | Mod: S$GLB,,, | Performed by: NURSE PRACTITIONER

## 2020-07-17 PROCEDURE — 3008F BODY MASS INDEX DOCD: CPT | Mod: CPTII,S$GLB,, | Performed by: NURSE PRACTITIONER

## 2020-07-17 PROCEDURE — 1101F PR PT FALLS ASSESS DOC 0-1 FALLS W/OUT INJ PAST YR: ICD-10-PCS | Mod: CPTII,S$GLB,, | Performed by: NURSE PRACTITIONER

## 2020-07-17 PROCEDURE — 1101F PT FALLS ASSESS-DOCD LE1/YR: CPT | Mod: CPTII,S$GLB,, | Performed by: NURSE PRACTITIONER

## 2020-07-17 PROCEDURE — 3008F PR BODY MASS INDEX (BMI) DOCUMENTED: ICD-10-PCS | Mod: CPTII,S$GLB,, | Performed by: NURSE PRACTITIONER

## 2020-07-17 PROCEDURE — 76705 US ABDOMEN LIMITED: ICD-10-PCS | Mod: 26,,, | Performed by: RADIOLOGY

## 2020-07-17 PROCEDURE — 85610 PROTHROMBIN TIME: CPT

## 2020-07-17 PROCEDURE — 1159F PR MEDICATION LIST DOCUMENTED IN MEDICAL RECORD: ICD-10-PCS | Mod: S$GLB,,, | Performed by: NURSE PRACTITIONER

## 2020-07-17 PROCEDURE — 1126F PR PAIN SEVERITY QUANTIFIED, NO PAIN PRESENT: ICD-10-PCS | Mod: S$GLB,,, | Performed by: NURSE PRACTITIONER

## 2020-07-17 PROCEDURE — 85025 COMPLETE CBC W/AUTO DIFF WBC: CPT

## 2020-07-17 PROCEDURE — 36415 COLL VENOUS BLD VENIPUNCTURE: CPT

## 2020-07-17 PROCEDURE — 99214 OFFICE O/P EST MOD 30 MIN: CPT | Mod: S$GLB,,, | Performed by: NURSE PRACTITIONER

## 2020-07-17 PROCEDURE — 82105 ALPHA-FETOPROTEIN SERUM: CPT

## 2020-07-17 PROCEDURE — 76705 ECHO EXAM OF ABDOMEN: CPT | Mod: TC

## 2020-07-17 PROCEDURE — 99999 PR PBB SHADOW E&M-EST. PATIENT-LVL IV: ICD-10-PCS | Mod: PBBFAC,,, | Performed by: NURSE PRACTITIONER

## 2020-07-17 PROCEDURE — 99999 PR PBB SHADOW E&M-EST. PATIENT-LVL IV: CPT | Mod: PBBFAC,,, | Performed by: NURSE PRACTITIONER

## 2020-07-17 RX ORDER — BUSPIRONE HYDROCHLORIDE 10 MG/1
10 TABLET ORAL 3 TIMES DAILY
COMMUNITY
End: 2021-01-01

## 2020-07-17 NOTE — TELEPHONE ENCOUNTER
Please call patient (and mail copy of all results from today):    Labs are stable, liver is working well.  Blood test for liver cancer screening is normal.    Ultrasound does not show any findings concerning for liver cancer.     Next labs to monitor the liver and ultrasound for liver cancer screening should be in 6 months (Jan 2021); to be arranged by the new provider monitoring her liver locally.    She should let me know if she has questions or concerns. Thanks!

## 2020-07-17 NOTE — PATIENT INSTRUCTIONS
1. Will let you know when labs and ultrasound from today result     2. Need to establish care with new local provider to monitor your liver.  You will need an ultrasound every 6 months for liver cancer screening -- next due January 2021  You are due for another EGD (endoscopy) now to screen for varices (enlarged blood vessels in the esophagus that can cause bleeding)    3. Continue lasix and low salt/sodium diet to help with swelling.     4. Let us know if you would like to return to [facility] for liver care at any time in the future         Signs and symptoms of worsening liver disease include jaundice (yellow skin/eyes), fluid in the abdomen (ascites), and confusion/disorientation/slowed thought processes due to hepatic encephalopathy (toxins building up when the liver is not working properly). You should seek medical attention if any of these things occur. Also, possible bleeding from esophageal varices (blood vessels in the stomach and foodpipe that can burst and cause bleeding). If you have symptoms of vomiting blood, blood in your stool, maroon or black stools, or coffee ground vomit, you should go to the emergency room.     Cirrhosis Counseling  -- Strict abstinence from alcohol (includes beer, wine, and/or liquor)  -- Avoid non-steroidal anti-inflammatory drugs (NSAIDs) such as ibuprofen (Motrin, Advil), naproxen (Naprosyn, Aleve)  -- Tylenol/acetaminophen as needed for pain, no more than 2000 mg per day  -- No raw shellfish due to the risk of Vibrio vulnificus infection  -- Low sodium diet, less than 2000 mg per day   -- High protein diet to prevent muscle mass loss. Can drink protein shakes (Premier Protein is a great option because it is very high protein and low sugar)  -- Periodic upper endoscopy (EGD) to screen for varices (enlarged blood vessels) in the stomach and esophagus which can burst and cause bleeding  -- Ultrasound of the liver every 6 months for liver cancer screening.

## 2020-09-25 PROBLEM — Z86.010 PERSONAL HISTORY OF COLONIC POLYPS: Status: ACTIVE | Noted: 2020-09-25

## 2020-12-16 PROBLEM — Z51.11 ENCOUNTER FOR CHEMOTHERAPY MANAGEMENT: Status: ACTIVE | Noted: 2020-12-16

## 2021-01-01 ENCOUNTER — TELEPHONE (OUTPATIENT)
Dept: HEMATOLOGY/ONCOLOGY | Facility: CLINIC | Age: 71
End: 2021-01-01

## 2021-06-22 PROBLEM — Z71.2 ENCOUNTER TO DISCUSS TEST RESULTS: Status: ACTIVE | Noted: 2021-06-22

## 2021-06-22 PROBLEM — R60.1 ANASARCA: Status: ACTIVE | Noted: 2021-06-22

## 2021-07-09 PROBLEM — R91.1 SOLITARY PULMONARY NODULE: Status: ACTIVE | Noted: 2021-07-09

## 2021-07-15 PROBLEM — C34.91: Status: ACTIVE | Noted: 2021-07-15

## 2021-07-27 PROBLEM — R59.9 LYMPH NODE ENLARGEMENT: Status: ACTIVE | Noted: 2021-07-27

## 2021-08-17 PROBLEM — I77.9 CAROTID ARTERIAL DISEASE: Status: RESOLVED | Noted: 2019-03-14 | Resolved: 2021-08-17

## 2021-08-17 PROBLEM — I85.10 SECONDARY ESOPHAGEAL VARICES WITHOUT BLEEDING: Status: RESOLVED | Noted: 2020-07-17 | Resolved: 2021-08-17

## 2021-08-31 NOTE — PROGRESS NOTES
BARIATRIC SURGERY OFFICE PROGRESS NOTE    SUBJECTIVE:    Patient presenting today referred from [facility], for   Chief Complaint   Patient presents with    Weight Management     6th surg wm    . Vitals:    08/31/21 1522   BP: (!) 130/90   Pulse: 115   SpO2: 100%        BMI: Body mass index is 47.59 kg/m². Weight History: Wt Readings from Last 3 Encounters:   08/31/21 290 lb 6.4 oz (131.7 kg)   07/22/21 295 lb 1.6 oz (133.9 kg)   07/02/21 (!) 301 lb (136.5 kg)        If within 30 days of bariatric surgery date, have you been to the ED: Taat Patricia is a 63 y.o. female presenting in sixth bariatric PRE-OP visit. Total weight loss/gain: -4.7 lbs since last visit, n/a lbs since surgery, -33.3 lbs since starting program    Thoroughly reviewed the patient's medical history, family history, social history and review of systems with the patient today in the office. Please see medical record for pertinent positives. Changes in health since last visit:  None. Had EGD. Cleared by Pulm. Pt tracking calories: Yes. Pt exercising: Yes. 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 Julia Stewart Deaf, left     Deaf in left ear, hearing aid in right ear    Diabetes mellitus ([facility] [address]     Type II - Follows with Dr. Jamey Wilson in [facility] History of nuclear stress test 12/17/2020    EF 62%.  Normal.    Hyperlipidemia     Hypertension     PCP    MAJOR (obstructive sleep apnea)     Does not have CPAP as of 6/30/21        Patient Active Problem List   Diagnosis    Morbid obesity with BMI of 45.0-49.9, adult ([address]    Fatigue       Past Surgical History:   Procedure Laterality Date    BACK SURGERY  2015    CARPAL TUNNEL RELEASE  2018    Right    EAR SURGERY  1969    Left    HERNIA REPAIR  0458    Umbilical - done during hyster    HYSTERECTOMY, TOTAL ABDOMINAL  1996    NASAL POLYP SURGERY  [id number]    X 3    UPPER GASTROINTESTINAL [facility] - Established Patient     Last Clinic Visit: 8/2019    CHIEF COMPLAINT: Follow-up for cirrhosis    HPI: This is a 70 y.o. White female here for follow-up for cirrhosis, presumed due to STREET. Previously followed by Dr. Krause; new patient to me.   She is here today with .    Previous diagnosis of liver disease multiple years ago. At initial visit she was unsure of underlying cause though external notes report STREET cirrhosis. Liver biopsy reported from 2014 though no results for review.    Cirrhosis was confirmed with Fibroscan here 8/2019 = kPa 44.7 (F4 / cirrhosis).     Etiology suspected to be STREET and her risk factors include HTN, HLD, DM. DM had not been well controlled, HgbA1c 8-10 though no recent results for review.    Serologic workup negative for other causes of liver disease.   HCV Ab+, though RNA negative     Cirrhosis has been well compensated with low MELD.     Current symptoms of hepatic decompensation:              Ascites: mild on prior imaging              LE edema: mild swelling to R leg   *Diuretics - lasix 40 mg               Hepatic encephalopathy: denies              GI bleeding: denies              Jaundice: no    Main concern is diarrhea which she has had for years. Reports negative GI workup. Takes lomotil PRN.    She has moved to [place] and changed insurance. Now restricted to providers within a certain radius from her home. Looking to establish care with new GI/hepatology provider, Endocrine, and PCP.    Cirrhosis Health Maintenance:  -- HCC screening: abd US today with no focal hepatic lesions; AFP pending   -- Variceal screening: EGD 4/2017 - varices   -- Hepatitis A & B vaccination: +hep B immunity, needs hep A vaccines         Review of patient's allergies indicates:   Allergen Reactions    Codeine Itching     burning        Current Outpatient Medications on File Prior to Visit   Medication Sig Dispense Refill    biotin 5,000 mcg TbDL Take 5,000 mg by  ENDOSCOPY N/A 7/2/2021    EGD BIOPSY performed by Pb Martin MD at [facility] ENDOSCOPY       Current Outpatient Medications   Medication Sig Dispense Refill    aspirin (ASPIRIN 81) 81 MG chewable tablet 1 tab(s)      canagliflozin (INVOKANA) 300 MG TABS tablet Take 300 mg by mouth every morning (before breakfast)      pioglitazone (ACTOS) 30 MG tablet Take 30 mg by mouth daily      lisinopril (PRINIVIL;ZESTRIL) 20 MG tablet Take 20 mg by mouth daily      chlorthalidone (HYGROTON) 25 MG tablet Take 25 mg by mouth daily (Patient not taking: Reported on 8/23/2021)      pravastatin (PRAVACHOL) 40 MG tablet Take 40 mg by mouth daily      sitaGLIPtan-metFORMIN (JANUMET)  MG per tablet Take 1 tablet by mouth 2 times daily (with meals)      Icosapent Ethyl (VASCEPA) 1 g CAPS capsule Take 2 capsules by mouth 2 times daily      Semaglutide, 1 MG/DOSE, (OZEMPIC, 1 MG/DOSE,) 2 MG/1.5ML SOPN Inject 1 mg into the skin every 7 days Takes on Monday       No current facility-administered medications for this visit. Allergies   Allergen Reactions    Sulfa Antibiotics Rash         Review of Systems   Constitutional: Positive for activity change. Musculoskeletal: Positive for arthralgias. All other systems reviewed and are negative. OBJECTIVE:    BP (!) 130/90   Pulse 115   Ht 5' 5.5\" (1.664 m)   Wt 290 lb 6.4 oz (131.7 kg)   SpO2 100%   BMI 47.59 kg/m²      Physical Exam  Vitals reviewed. Constitutional:       General: She is not in acute distress. Appearance: She is obese. She is not ill-appearing, toxic-appearing or diaphoretic. HENT:      Head: Normocephalic and atraumatic. Right Ear: External ear normal.      Left Ear: External ear normal.      Nose: Nose normal.   Eyes:      General:         Right eye: No discharge. Left eye: No discharge. Extraocular Movements: Extraocular movements intact. Cardiovascular:      Rate and Rhythm: Normal rate.       Pulses: Normal mouth.      busPIRone (BUSPAR) 10 MG tablet Take 10 mg by mouth 3 (three) times daily.      cyanocobalamin, vitamin B-12, 2,500 mcg Subl Place under the tongue.      dicyclomine (BENTYL) 10 MG capsule Take 10 mg by mouth 4 (four) times daily before meals and nightly.      diphenoxylate-atropine 2.5-0.025 mg (LOMOTIL) 2.5-0.025 mg per tablet TAKE ONE TABLET BY MOUTH THREE TIMES DAILY AS NEEDED FOR DIARRHEA 60 tablet 1    escitalopram oxalate (LEXAPRO) 20 MG tablet Take 20 mg by mouth once daily.      furosemide (LASIX) 40 MG tablet Take 40 mg by mouth once daily.  6    gabapentin (NEURONTIN) 600 MG tablet Take 600 mg by mouth 3 (three) times daily.      imatinib mesylate (IMATINIB ORAL) Take by mouth once daily.       metoprolol succinate (TOPROL-XL) 50 MG 24 hr tablet Take 50 mg by mouth 2 (two) times daily.  6    multivitamin capsule Take 1 capsule by mouth once daily.      ONETOUCH ULTRA TEST Strp       rosuvastatin (CRESTOR) 20 MG tablet Take 20 mg by mouth once daily.      valsartan (DIOVAN) 80 MG tablet Take 80 mg by mouth once daily.      vitamin D 1000 units Tab Take 2,000 Units by mouth 2 (two) times daily.      [DISCONTINUED] beclomethasone (QVAR) 80 mcg/actuation Aero Inhale 1 puff into the lungs 2 (two) times daily. Rinse mouth after each use. 1 each 6    [DISCONTINUED] calcium carbonate (OS-ALISSA) 600 mg (1,500 mg) Tab Take 600 mg by mouth once daily.      [DISCONTINUED] diphenhydrAMINE (BENADRYL) 25 mg capsule Take 1 each (25 mg total) by mouth every 6 (six) hours as needed for Itching or Allergies. (Patient not taking: Reported on 7/17/2020) 20 capsule 0    [DISCONTINUED] HYDROcodone-acetaminophen (NORCO) 5-325 mg per tablet Take 1 tablet by mouth every 6 (six) hours as needed for Pain.      [DISCONTINUED] potassium gluconate 595 mg (99 mg) Tab Take by mouth once daily.      [DISCONTINUED] ranitidine (ZANTAC) 150 MG tablet Take 150 mg by mouth 2 (two) times daily.       No current  facility-administered medications on file prior to visit.          PMHX:  has a past medical history of Arthritis, COPD (chronic obstructive pulmonary disease), Diabetes mellitus, type 2, and Hypertension.    PSHX:  has a past surgical history that includes Trigger finger release; Hysterectomy; Knee surgery; Eye surgery; and Back surgery.    FAMILY HISTORY:   Family History   Problem Relation Age of Onset    Colon cancer Neg Hx     Esophageal cancer Neg Hx        SOCIAL HISTORY:   Social History     Tobacco Use   Smoking Status Current Every Day Smoker       Social History     Substance and Sexual Activity   Alcohol Use No    Alcohol/week: 0.0 standard drinks       Social History     Substance and Sexual Activity   Drug Use Not on file         Review of Systems   Constitutional: Negative for appetite change, chills, fatigue, fever and unexpected weight change.   Eyes: Negative for visual disturbance.   Respiratory: Negative for cough and shortness of breath.    Cardiovascular: Negative for chest pain, palpitations. (+) mild swelling to R leg   Gastrointestinal: Negative for abdominal distention, blood in stool, constipation, nausea and vomiting. (+) diarrhea, epigastric pain. No change in stool color.  Genitourinary: Negative for dysuria and hematuria. Denies dark urine.   Musculoskeletal: Negative for arthralgias, gait problem, joint swelling and myalgias. (+) trigger finger / incision from recent surgery    Skin: Negative for color change, rash and wound. Denies itching.   Neurological: Negative for dizziness, tremors, speech difficulty, and headaches.   Hematological: Does not bruise/bleed easily.   Psychiatric/Behavioral: Negative for confusion, decreased concentration and sleep disturbance. Denies memory loss. Denies depression. The patient is not nervous/anxious.        Physical Exam   Constitutional: Well-nourished. No distress. Alert and oriented to person, place, and time.  Eyes: No scleral icterus.  pulses. Abdominal:      Palpations: Abdomen is soft. Tenderness: There is no abdominal tenderness. Musculoskeletal:         General: No swelling. Cervical back: Normal range of motion. Skin:     General: Skin is warm. Neurological:      General: No focal deficit present. Mental Status: She is alert. ASSESSMENT & PLAN:    1. Morbid obesity with BMI of 45.0-49.9, adult (HCC)  -Down 33.3 lbs since starting program.  -Reviewed Pulm, cleared. -Reviewed UDS and urine nicotine tests.   -All requirements met. Submit file for surgery. -F/u for consent.   -Reviewed EGD results and pathology. Copy given to pt.   -Labs to be put into Epic. 2. MAJOR  -Continue CPAP. -Planned repeat sleep study after surgery    3. DM  -Actos, metformin, semaglutide    4. HTN  -Lisinopril    5. HLD  -Pravastatin             As of current visit, regarding obesity-related co-morbid conditions:  MAJOR [] compliant [] no longer using [] resolved per sleep study; hypertension [] medications; hyperlipidemia [] medications; GERD [] medications; DM [] insulin [] non-insulin [] no meds       Patient was encouraged to journal all food intake. Keep calorie level at approximately 1200, per discussion / plan with registered dietician. Protein intake is to be a minimum of 40-50 grams per day. Water drinking was encouraged with a goal of 64oz-128oz daily. Beverages are to be calorie free except for milk. Avoid soda and other carbonated beverages. Continue to increase level of physical activity. I spent 25 minutes with the patient face to face today and over 50% of the office visit today was spent in face to face counseling regarding diet and exercise, in preparation for her planned robotic sleeve gastrectomy.     Discussed in length complying with the dietary recommendations, complying with the preoperative workup including dietary counseling  exercise physiologist counseling , and pre-operative optimization "  Cardiovascular: Normal rate, regular rhythm and normal heart sounds. No murmur heard.  Pulmonary/Chest: Respiratory effort and breath sounds normal. No respiratory distress.   Abdominal: Soft, non-tender. No distension; no ascites appreciated. There is no palpable hepatosplenomegaly. No hernia or mass.   Musculoskeletal: No edema.   Neurological: No tremor. Coordination and gait normal. No asterixis.    Skin: Skin is warm and dry. No rash or erythema. No jaundice. No telangiectasias or palmar erythema noted.  Psychiatric: Normal mood and affect. Speech, behavior, and thought content normal. No depression or anxiety noted.       BP (!) 124/58 (BP Location: Right arm, Patient Position: Sitting, BP Method: Medium (Automatic))   Pulse 69   Ht 5' 2" (1.575 m)   Wt 62.7 kg (138 lb 3.7 oz)   SpO2 95%   BMI 25.28 kg/m²         LABS:  Lab Results   Component Value Date    WBC 11.39 07/17/2020    HGB 11.5 (L) 07/17/2020    HCT 35.3 (L) 07/17/2020     (L) 07/17/2020     08/12/2019    K 3.8 08/12/2019    CREATININE 1.3 08/12/2019    ALT 18 08/12/2019    AST 30 08/12/2019    ALKPHOS 58 08/12/2019    BILITOT 1.0 08/12/2019    BILIDIR 0.3 06/22/2010    ALBUMIN 4.1 08/12/2019    INR 1.0 08/12/2019    SMOOTHMUSCAB Negative 1:40 08/12/2019    IGGSERUM 931 08/12/2019    ANASCREEN Negative <1:160 08/12/2019    FERRITIN 51 08/12/2019    FESATURATED 29 08/12/2019    CERULOPLSM 30.0 08/12/2019    CHOL 118 (L) 01/28/2010    TRIG 218 (H) 01/28/2010    LDLCALC 47.4 (L) 01/28/2010    HGBA1C 8.7 (H) 08/11/2011       Hepatitis A Antibody IgG   Date Value Ref Range Status   08/12/2019 Negative  Final       No results found for: HEPBSAG  Hep B Core Total Ab   Date Value Ref Range Status   08/12/2019 Positive (A)  Final     No results found for: HEPBSAB  No results found for: HEPCAB    There is no immunization history on file for this patient.       DIAGNOSTIC STUDIES:  ABD. US - 7/17/20  FINDINGS:  Liver: Normal in size, " measuring 17.0 cm. Diffuse nodular contour consistent with cirrhosis. no focal hepatic lesions.  Gallbladder: The gallbladder is surgically absent.  Biliary system: The common duct is not dilated, measuring 2 mm.  No intrahepatic ductal dilatation.  Spleen: Normal in size and echotexture, measuring 9.3 x 3.8 cm.  Splenic venous collaterals are present.  Miscellaneous: No upper abdominal ascites.     Impression:  Stigmata of cirrhosis and portal hypertension including nodular contour liver and splenic venous collaterals.  No suspicious hepatic lesions.        ASSESSMENT / PLAN:  70 y.o. White female with:    1. Cirrhosis, presumed due to STREET, well compensated  -- MELD-Na score: 9 at 8/12/2019 11:38 AM  MELD score: 9 at 8/12/2019 11:38 AM  Calculated from:  Serum Creatinine: 1.3 mg/dL at 8/12/2019 11:38 AM  Serum Sodium: 139 mmol/L (Rounded to 137 mmol/L) at 8/12/2019 11:38 AM  Total Bilirubin: 1.0 mg/dL at 8/12/2019 11:38 AM  INR(ratio): 1.0 at 8/12/2019 11:38 AM  Age: 69 years 4 months  -- MELD previously <15, too early for transplant evaluation at this time. Awaiting repeat MELD labs from today  -- Monitor MELD labs every 6 months  -- Continue HCC screening every 6 months with ultrasound and AFP, next due 1/2021    2. Portal hypertension, esophageal varices  -- EGD for varices screening due now, encouraged to establish care with local GI to have this done. May need repeat colonoscopy as diarrhea has not improved.     3. History of ascites on imaging, RLE edema  -- Continue lasix 40 mg daily  -- No ascites on US today  -- Low Na diet recommended     4. Hepatitis C Ab +  -- RNA negative, no chronic hep C infection.         She reports that she can no longer be followed in Hepatology here due to insurance restrictions. Recommendations for liver monitoring provided. Can return at any time in the future.         EDUCATION / DISCUSSION:   The disease process and manifestations of cirrhosis were discussed. Signs and  symptoms of hepatic decompensation were reviewed, including jaundice, ascites, and slowed mentation due to hepatic encephalopathy. The patient should seek medical attention if any of these things occur.  We discussed the potential for bleeding from esophageal varices with symptoms of hematemesis and melena. The patient should report to the Emergency Department for these symptoms.    We discussed the increased risk of hepatocellular carcinoma (HCC) due to cirrhosis. Continued HCC screening every six months with ultrasound and AFP tumor marker is recommended.     Cirrhosis Counseling  -- Strict abstinence from alcohol (includes beer, wine, and/or liquor)  -- Avoid non-steroidal anti-inflammatory drugs (NSAIDs) such as ibuprofen (Motrin, Advil), naproxen (Naprosyn, Aleve)  -- Tylenol/acetaminophen as needed for pain, no more than 2000 mg per day  -- No raw shellfish due to the risk of Vibrio vulnificus infection  -- Low sodium diet, less than 2000 mg per day   -- High protein diet to prevent muscle mass loss. Can drink protein shakes (Premier Protein is a great option because it is very high protein and low sugar)  -- Periodic upper endoscopy to screen for varices (enlarged blood vessels) in the stomach and esophagus which can burst and cause bleeding  -- Ultrasound of the liver every 6 months for liver cancer screening          Thank you for allowing me to participate in the care of MADELIN Moya-C  Hepatology

## 2021-09-02 ENCOUNTER — TELEPHONE (OUTPATIENT)
Dept: HEMATOLOGY/ONCOLOGY | Facility: CLINIC | Age: 71
End: 2021-09-02

## 2021-09-03 ENCOUNTER — OFFICE VISIT (OUTPATIENT)
Dept: HEMATOLOGY/ONCOLOGY | Facility: CLINIC | Age: 71
End: 2021-09-03

## 2021-09-03 ENCOUNTER — TELEPHONE (OUTPATIENT)
Dept: HEMATOLOGY/ONCOLOGY | Facility: CLINIC | Age: 71
End: 2021-09-03

## 2021-09-03 VITALS
HEIGHT: 62 IN | OXYGEN SATURATION: 97 % | DIASTOLIC BLOOD PRESSURE: 72 MMHG | BODY MASS INDEX: 22.52 KG/M2 | WEIGHT: 122.38 LBS | SYSTOLIC BLOOD PRESSURE: 155 MMHG | TEMPERATURE: 98 F | RESPIRATION RATE: 16 BRPM | HEART RATE: 63 BPM

## 2021-09-03 DIAGNOSIS — C34.91 PRIMARY LUNG SQUAMOUS CELL CARCINOMA, RIGHT: Primary | ICD-10-CM

## 2021-09-03 DIAGNOSIS — D69.6 THROMBOCYTOPENIA: ICD-10-CM

## 2021-09-03 DIAGNOSIS — Z51.11 ENCOUNTER FOR CHEMOTHERAPY MANAGEMENT: ICD-10-CM

## 2021-09-03 LAB
ALBUMIN SERPL BCP-MCNC: 3.1 G/DL (ref 3.4–5)
ALBUMIN/GLOBULIN RATIO: 0.91 RATIO (ref 1.1–1.8)
ALP SERPL-CCNC: 65 U/L (ref 46–116)
ALT SERPL W P-5'-P-CCNC: 19 U/L (ref 12–78)
ANION GAP SERPL CALC-SCNC: 6 MMOL/L (ref 3–11)
ANISOCYTOSIS: ABNORMAL
AST SERPL-CCNC: 28 U/L (ref 15–37)
BILIRUB SERPL-MCNC: 0.6 MG/DL (ref 0–1)
BUN SERPL-MCNC: 27 MG/DL (ref 7–18)
BUN/CREAT SERPL: 20.45 RATIO (ref 7–18)
CALCIUM SERPL-MCNC: 8.5 MG/DL (ref 8.8–10.5)
CELLS COUNTED: 100
CHLORIDE SERPL-SCNC: 108 MMOL/L (ref 100–108)
CO2 SERPL-SCNC: 26 MMOL/L (ref 21–32)
CREAT SERPL-MCNC: 1.32 MG/DL (ref 0.55–1.02)
ERYTHROCYTE [DISTWIDTH] IN BLOOD BY AUTOMATED COUNT: 14.9 % (ref 12.5–18)
GFR ESTIMATION: 40
GLOBULIN: 3.4 G/DL (ref 2.3–3.5)
GLUCOSE SERPL-MCNC: 160 MG/DL (ref 70–110)
HCT VFR BLD AUTO: 30.4 % (ref 37–47)
HGB BLD-MCNC: 9.7 G/DL (ref 12–16)
HYPOCHROMIA BLD QL SMEAR: ABNORMAL
LYMPHOCYTES NFR BLD: 24 % (ref 25–40)
MAGNESIUM SERPL-MCNC: 1.3 MG/DL (ref 1.8–2.4)
MCH RBC QN AUTO: 31.7 PG (ref 27–31.2)
MCHC RBC AUTO-ENTMCNC: 31.9 G/DL (ref 31.8–35.4)
MCV RBC AUTO: 99.3 FL (ref 80–97)
MONOCYTES NFR BLD: 4 % (ref 1–15)
NEUTROPHILS # BLD AUTO: 2.1 10*3/UL (ref 1.8–7.7)
NEUTROPHILS NFR BLD: 72 % (ref 37–80)
NUCLEATED RED BLOOD CELLS: 0 %
PLATELETS: 45 10*3/UL (ref 142–424)
POTASSIUM SERPL-SCNC: 4.7 MMOL/L (ref 3.6–5.2)
PROT SERPL-MCNC: 6.5 G/DL (ref 6.4–8.2)
RBC # BLD AUTO: 3.06 10*6/UL (ref 4.2–5.4)
SMALL PLATELETS BLD QL SMEAR: ABNORMAL
SODIUM BLD-SCNC: 140 MMOL/L (ref 135–145)
WBC # BLD: 2.9 10*3/UL (ref 4.6–10.2)

## 2021-09-03 PROCEDURE — 99214 PR OFFICE/OUTPT VISIT, EST, LEVL IV, 30-39 MIN: ICD-10-PCS | Mod: S$GLB,,, | Performed by: NURSE PRACTITIONER

## 2021-09-03 PROCEDURE — 99214 OFFICE O/P EST MOD 30 MIN: CPT | Mod: S$GLB,,, | Performed by: NURSE PRACTITIONER

## 2021-09-09 ENCOUNTER — OFFICE VISIT (OUTPATIENT)
Dept: HEMATOLOGY/ONCOLOGY | Facility: CLINIC | Age: 71
End: 2021-09-09
Payer: MEDICARE

## 2021-09-09 VITALS
HEIGHT: 62 IN | SYSTOLIC BLOOD PRESSURE: 137 MMHG | DIASTOLIC BLOOD PRESSURE: 73 MMHG | WEIGHT: 126 LBS | RESPIRATION RATE: 16 BRPM | HEART RATE: 70 BPM | TEMPERATURE: 98 F | BODY MASS INDEX: 23.19 KG/M2 | OXYGEN SATURATION: 97 %

## 2021-09-09 DIAGNOSIS — D69.6 THROMBOCYTOPENIA: ICD-10-CM

## 2021-09-09 DIAGNOSIS — C34.91 PRIMARY LUNG SQUAMOUS CELL CARCINOMA, RIGHT: ICD-10-CM

## 2021-09-09 DIAGNOSIS — E83.42 HYPOMAGNESEMIA: Primary | ICD-10-CM

## 2021-09-09 LAB
ALBUMIN SERPL BCP-MCNC: 3 G/DL (ref 3.4–5)
ALBUMIN/GLOBULIN RATIO: 0.88 RATIO (ref 1.1–1.8)
ALP SERPL-CCNC: 70 U/L (ref 46–116)
ALT SERPL W P-5'-P-CCNC: 19 U/L (ref 12–78)
ANION GAP SERPL CALC-SCNC: 3 MMOL/L (ref 3–11)
AST SERPL-CCNC: 23 U/L (ref 15–37)
BASOPHILS NFR BLD: 0.2 % (ref 0–3)
BILIRUB SERPL-MCNC: 0.4 MG/DL (ref 0–1)
BUN SERPL-MCNC: 22 MG/DL (ref 7–18)
BUN/CREAT SERPL: 23.65 RATIO (ref 7–18)
CALCIUM SERPL-MCNC: 8.4 MG/DL (ref 8.8–10.5)
CHLORIDE SERPL-SCNC: 105 MMOL/L (ref 100–108)
CO2 SERPL-SCNC: 31 MMOL/L (ref 21–32)
CREAT SERPL-MCNC: 0.93 MG/DL (ref 0.55–1.02)
EOSINOPHIL NFR BLD: 0.7 % (ref 1–3)
ERYTHROCYTE [DISTWIDTH] IN BLOOD BY AUTOMATED COUNT: 16.1 % (ref 12.5–18)
GFR ESTIMATION: 60
GLOBULIN: 3.4 G/DL (ref 2.3–3.5)
GLUCOSE SERPL-MCNC: 232 MG/DL (ref 70–110)
HCT VFR BLD AUTO: 30.5 % (ref 37–47)
HGB BLD-MCNC: 9.8 G/DL (ref 12–16)
LYMPHOCYTES NFR BLD: 10.3 % (ref 25–40)
MACROCYTES BLD QL SMEAR: NORMAL
MAGNESIUM SERPL-MCNC: 1.2 MG/DL (ref 1.8–2.4)
MCH RBC QN AUTO: 32.7 PG (ref 27–31.2)
MCHC RBC AUTO-ENTMCNC: 32.1 G/DL (ref 31.8–35.4)
MCV RBC AUTO: 101.7 FL (ref 80–97)
MONOCYTES NFR BLD: 8.2 % (ref 1–15)
NEUTROPHILS # BLD AUTO: 3.28 10*3/UL (ref 1.8–7.7)
NEUTROPHILS NFR BLD: 78.9 % (ref 37–80)
NUCLEATED RED BLOOD CELLS: 0 %
PLATELETS: 51 10*3/UL (ref 142–424)
POTASSIUM SERPL-SCNC: 4.9 MMOL/L (ref 3.6–5.2)
PROT SERPL-MCNC: 6.4 G/DL (ref 6.4–8.2)
RBC # BLD AUTO: 3 10*6/UL (ref 4.2–5.4)
SMALL PLATELETS BLD QL SMEAR: NORMAL
SODIUM BLD-SCNC: 139 MMOL/L (ref 135–145)
WBC # BLD: 4.2 10*3/UL (ref 4.6–10.2)

## 2021-09-09 PROCEDURE — 99214 PR OFFICE/OUTPT VISIT, EST, LEVL IV, 30-39 MIN: ICD-10-PCS | Mod: S$GLB,,, | Performed by: NURSE PRACTITIONER

## 2021-09-09 PROCEDURE — 99214 OFFICE O/P EST MOD 30 MIN: CPT | Mod: S$GLB,,, | Performed by: NURSE PRACTITIONER

## 2021-09-09 RX ORDER — LANOLIN ALCOHOL/MO/W.PET/CERES
400 CREAM (GRAM) TOPICAL DAILY
Qty: 90 TABLET | Refills: 1 | Status: SHIPPED | OUTPATIENT
Start: 2021-09-09 | End: 2022-01-01

## 2021-10-28 PROBLEM — Z51.12 ENCOUNTER FOR ANTINEOPLASTIC CHEMOTHERAPY AND IMMUNOTHERAPY: Status: ACTIVE | Noted: 2020-12-16

## 2021-12-01 PROBLEM — K59.1 FUNCTIONAL DIARRHEA: Status: ACTIVE | Noted: 2021-01-01

## 2022-01-01 ENCOUNTER — TELEPHONE (OUTPATIENT)
Dept: HEPATOLOGY | Facility: CLINIC | Age: 72
End: 2022-01-01
Payer: MEDICARE

## 2022-01-01 ENCOUNTER — LAB VISIT (OUTPATIENT)
Dept: LAB | Facility: HOSPITAL | Age: 72
End: 2022-01-01
Attending: STUDENT IN AN ORGANIZED HEALTH CARE EDUCATION/TRAINING PROGRAM
Payer: MEDICARE

## 2022-01-01 ENCOUNTER — OFFICE VISIT (OUTPATIENT)
Dept: HEPATOLOGY | Facility: CLINIC | Age: 72
End: 2022-01-01
Payer: MEDICARE

## 2022-01-01 VITALS
HEIGHT: 62 IN | TEMPERATURE: 98 F | SYSTOLIC BLOOD PRESSURE: 148 MMHG | DIASTOLIC BLOOD PRESSURE: 72 MMHG | RESPIRATION RATE: 18 BRPM | BODY MASS INDEX: 28.72 KG/M2 | WEIGHT: 156.06 LBS | HEART RATE: 79 BPM | OXYGEN SATURATION: 95 %

## 2022-01-01 DIAGNOSIS — K75.81 LIVER CIRRHOSIS SECONDARY TO NASH: Primary | ICD-10-CM

## 2022-01-01 DIAGNOSIS — D84.9 IMMUNOSUPPRESSED STATUS: ICD-10-CM

## 2022-01-01 DIAGNOSIS — R18.8 OTHER ASCITES: ICD-10-CM

## 2022-01-01 DIAGNOSIS — K75.81 LIVER CIRRHOSIS SECONDARY TO NASH: ICD-10-CM

## 2022-01-01 DIAGNOSIS — K74.60 LIVER CIRRHOSIS SECONDARY TO NASH: ICD-10-CM

## 2022-01-01 DIAGNOSIS — K74.60 LIVER CIRRHOSIS SECONDARY TO NASH: Primary | ICD-10-CM

## 2022-01-01 LAB
AFP SERPL-MCNC: <2 NG/ML (ref 0–8.4)
ALBUMIN SERPL BCP-MCNC: 2.7 G/DL (ref 3.5–5.2)
ALP SERPL-CCNC: 85 U/L (ref 55–135)
ALT SERPL W/O P-5'-P-CCNC: 19 U/L (ref 10–44)
ANION GAP SERPL CALC-SCNC: 13 MMOL/L (ref 8–16)
AST SERPL-CCNC: 42 U/L (ref 10–40)
BASOPHILS # BLD AUTO: 0.01 K/UL (ref 0–0.2)
BASOPHILS NFR BLD: 0.2 % (ref 0–1.9)
BILIRUB SERPL-MCNC: 0.7 MG/DL (ref 0.1–1)
BUN SERPL-MCNC: 12 MG/DL (ref 8–23)
CALCIUM SERPL-MCNC: 8.7 MG/DL (ref 8.7–10.5)
CHLORIDE SERPL-SCNC: 100 MMOL/L (ref 95–110)
CO2 SERPL-SCNC: 26 MMOL/L (ref 23–29)
CREAT SERPL-MCNC: 1.2 MG/DL (ref 0.5–1.4)
DIFFERENTIAL METHOD: ABNORMAL
EOSINOPHIL # BLD AUTO: 0.1 K/UL (ref 0–0.5)
EOSINOPHIL NFR BLD: 2.2 % (ref 0–8)
ERYTHROCYTE [DISTWIDTH] IN BLOOD BY AUTOMATED COUNT: 15 % (ref 11.5–14.5)
EST. GFR  (AFRICAN AMERICAN): 53 ML/MIN/1.73 M^2
EST. GFR  (NON AFRICAN AMERICAN): 46 ML/MIN/1.73 M^2
GLUCOSE SERPL-MCNC: 146 MG/DL (ref 70–110)
HCT VFR BLD AUTO: 37.7 % (ref 37–48.5)
HGB BLD-MCNC: 12.1 G/DL (ref 12–16)
IMM GRANULOCYTES # BLD AUTO: 0.04 K/UL (ref 0–0.04)
IMM GRANULOCYTES NFR BLD AUTO: 1 % (ref 0–0.5)
INR PPP: 1.1 (ref 0.8–1.2)
LYMPHOCYTES # BLD AUTO: 0.7 K/UL (ref 1–4.8)
LYMPHOCYTES NFR BLD: 16.7 % (ref 18–48)
MCH RBC QN AUTO: 30.9 PG (ref 27–31)
MCHC RBC AUTO-ENTMCNC: 32.1 G/DL (ref 32–36)
MCV RBC AUTO: 96 FL (ref 82–98)
MONOCYTES # BLD AUTO: 0.9 K/UL (ref 0.3–1)
MONOCYTES NFR BLD: 22.3 % (ref 4–15)
NEUTROPHILS # BLD AUTO: 2.4 K/UL (ref 1.8–7.7)
NEUTROPHILS NFR BLD: 57.6 % (ref 38–73)
NRBC BLD-RTO: 0 /100 WBC
PLATELET # BLD AUTO: 106 K/UL (ref 150–450)
PMV BLD AUTO: 10.5 FL (ref 9.2–12.9)
POTASSIUM SERPL-SCNC: 4.7 MMOL/L (ref 3.5–5.1)
PROT SERPL-MCNC: 5.9 G/DL (ref 6–8.4)
PROTHROMBIN TIME: 11.5 SEC (ref 9–12.5)
RBC # BLD AUTO: 3.92 M/UL (ref 4–5.4)
SODIUM SERPL-SCNC: 139 MMOL/L (ref 136–145)
WBC # BLD AUTO: 4.13 K/UL (ref 3.9–12.7)

## 2022-01-01 PROCEDURE — 80053 COMPREHEN METABOLIC PANEL: CPT | Performed by: STUDENT IN AN ORGANIZED HEALTH CARE EDUCATION/TRAINING PROGRAM

## 2022-01-01 PROCEDURE — 3008F PR BODY MASS INDEX (BMI) DOCUMENTED: ICD-10-PCS | Mod: CPTII,S$GLB,, | Performed by: STUDENT IN AN ORGANIZED HEALTH CARE EDUCATION/TRAINING PROGRAM

## 2022-01-01 PROCEDURE — 36415 COLL VENOUS BLD VENIPUNCTURE: CPT | Performed by: STUDENT IN AN ORGANIZED HEALTH CARE EDUCATION/TRAINING PROGRAM

## 2022-01-01 PROCEDURE — 3077F PR MOST RECENT SYSTOLIC BLOOD PRESSURE >= 140 MM HG: ICD-10-PCS | Mod: CPTII,S$GLB,, | Performed by: STUDENT IN AN ORGANIZED HEALTH CARE EDUCATION/TRAINING PROGRAM

## 2022-01-01 PROCEDURE — 3066F PR DOCUMENTATION OF TREATMENT FOR NEPHROPATHY: ICD-10-PCS | Mod: CPTII,S$GLB,, | Performed by: STUDENT IN AN ORGANIZED HEALTH CARE EDUCATION/TRAINING PROGRAM

## 2022-01-01 PROCEDURE — 85610 PROTHROMBIN TIME: CPT | Performed by: STUDENT IN AN ORGANIZED HEALTH CARE EDUCATION/TRAINING PROGRAM

## 2022-01-01 PROCEDURE — 3077F SYST BP >= 140 MM HG: CPT | Mod: CPTII,S$GLB,, | Performed by: STUDENT IN AN ORGANIZED HEALTH CARE EDUCATION/TRAINING PROGRAM

## 2022-01-01 PROCEDURE — 1101F PR PT FALLS ASSESS DOC 0-1 FALLS W/OUT INJ PAST YR: ICD-10-PCS | Mod: CPTII,S$GLB,, | Performed by: STUDENT IN AN ORGANIZED HEALTH CARE EDUCATION/TRAINING PROGRAM

## 2022-01-01 PROCEDURE — 3078F DIAST BP <80 MM HG: CPT | Mod: CPTII,S$GLB,, | Performed by: STUDENT IN AN ORGANIZED HEALTH CARE EDUCATION/TRAINING PROGRAM

## 2022-01-01 PROCEDURE — 3288F FALL RISK ASSESSMENT DOCD: CPT | Mod: CPTII,S$GLB,, | Performed by: STUDENT IN AN ORGANIZED HEALTH CARE EDUCATION/TRAINING PROGRAM

## 2022-01-01 PROCEDURE — 99999 PR PBB SHADOW E&M-EST. PATIENT-LVL V: ICD-10-PCS | Mod: PBBFAC,,, | Performed by: STUDENT IN AN ORGANIZED HEALTH CARE EDUCATION/TRAINING PROGRAM

## 2022-01-01 PROCEDURE — 1159F PR MEDICATION LIST DOCUMENTED IN MEDICAL RECORD: ICD-10-PCS | Mod: CPTII,S$GLB,, | Performed by: STUDENT IN AN ORGANIZED HEALTH CARE EDUCATION/TRAINING PROGRAM

## 2022-01-01 PROCEDURE — 3062F PR POS MACROALBUMINURIA RESULT DOCUMENTED/REVIEW: ICD-10-PCS | Mod: CPTII,S$GLB,, | Performed by: STUDENT IN AN ORGANIZED HEALTH CARE EDUCATION/TRAINING PROGRAM

## 2022-01-01 PROCEDURE — 99215 OFFICE O/P EST HI 40 MIN: CPT | Mod: S$GLB,,, | Performed by: STUDENT IN AN ORGANIZED HEALTH CARE EDUCATION/TRAINING PROGRAM

## 2022-01-01 PROCEDURE — 82105 ALPHA-FETOPROTEIN SERUM: CPT | Performed by: STUDENT IN AN ORGANIZED HEALTH CARE EDUCATION/TRAINING PROGRAM

## 2022-01-01 PROCEDURE — 1125F AMNT PAIN NOTED PAIN PRSNT: CPT | Mod: CPTII,S$GLB,, | Performed by: STUDENT IN AN ORGANIZED HEALTH CARE EDUCATION/TRAINING PROGRAM

## 2022-01-01 PROCEDURE — 1160F PR REVIEW ALL MEDS BY PRESCRIBER/CLIN PHARMACIST DOCUMENTED: ICD-10-PCS | Mod: CPTII,S$GLB,, | Performed by: STUDENT IN AN ORGANIZED HEALTH CARE EDUCATION/TRAINING PROGRAM

## 2022-01-01 PROCEDURE — 1125F PR PAIN SEVERITY QUANTIFIED, PAIN PRESENT: ICD-10-PCS | Mod: CPTII,S$GLB,, | Performed by: STUDENT IN AN ORGANIZED HEALTH CARE EDUCATION/TRAINING PROGRAM

## 2022-01-01 PROCEDURE — 3051F PR MOST RECENT HEMOGLOBIN A1C LEVEL 7.0 - < 8.0%: ICD-10-PCS | Mod: CPTII,S$GLB,, | Performed by: STUDENT IN AN ORGANIZED HEALTH CARE EDUCATION/TRAINING PROGRAM

## 2022-01-01 PROCEDURE — 4010F PR ACE/ARB THEARPY RXD/TAKEN: ICD-10-PCS | Mod: CPTII,S$GLB,, | Performed by: STUDENT IN AN ORGANIZED HEALTH CARE EDUCATION/TRAINING PROGRAM

## 2022-01-01 PROCEDURE — 3066F NEPHROPATHY DOC TX: CPT | Mod: CPTII,S$GLB,, | Performed by: STUDENT IN AN ORGANIZED HEALTH CARE EDUCATION/TRAINING PROGRAM

## 2022-01-01 PROCEDURE — 4010F ACE/ARB THERAPY RXD/TAKEN: CPT | Mod: CPTII,S$GLB,, | Performed by: STUDENT IN AN ORGANIZED HEALTH CARE EDUCATION/TRAINING PROGRAM

## 2022-01-01 PROCEDURE — 99215 PR OFFICE/OUTPT VISIT, EST, LEVL V, 40-54 MIN: ICD-10-PCS | Mod: S$GLB,,, | Performed by: STUDENT IN AN ORGANIZED HEALTH CARE EDUCATION/TRAINING PROGRAM

## 2022-01-01 PROCEDURE — 3078F PR MOST RECENT DIASTOLIC BLOOD PRESSURE < 80 MM HG: ICD-10-PCS | Mod: CPTII,S$GLB,, | Performed by: STUDENT IN AN ORGANIZED HEALTH CARE EDUCATION/TRAINING PROGRAM

## 2022-01-01 PROCEDURE — 85025 COMPLETE CBC W/AUTO DIFF WBC: CPT | Performed by: STUDENT IN AN ORGANIZED HEALTH CARE EDUCATION/TRAINING PROGRAM

## 2022-01-01 PROCEDURE — 1160F RVW MEDS BY RX/DR IN RCRD: CPT | Mod: CPTII,S$GLB,, | Performed by: STUDENT IN AN ORGANIZED HEALTH CARE EDUCATION/TRAINING PROGRAM

## 2022-01-01 PROCEDURE — 3062F POS MACROALBUMINURIA REV: CPT | Mod: CPTII,S$GLB,, | Performed by: STUDENT IN AN ORGANIZED HEALTH CARE EDUCATION/TRAINING PROGRAM

## 2022-01-01 PROCEDURE — 1101F PT FALLS ASSESS-DOCD LE1/YR: CPT | Mod: CPTII,S$GLB,, | Performed by: STUDENT IN AN ORGANIZED HEALTH CARE EDUCATION/TRAINING PROGRAM

## 2022-01-01 PROCEDURE — 1159F MED LIST DOCD IN RCRD: CPT | Mod: CPTII,S$GLB,, | Performed by: STUDENT IN AN ORGANIZED HEALTH CARE EDUCATION/TRAINING PROGRAM

## 2022-01-01 PROCEDURE — 3008F BODY MASS INDEX DOCD: CPT | Mod: CPTII,S$GLB,, | Performed by: STUDENT IN AN ORGANIZED HEALTH CARE EDUCATION/TRAINING PROGRAM

## 2022-01-01 PROCEDURE — 3051F HG A1C>EQUAL 7.0%<8.0%: CPT | Mod: CPTII,S$GLB,, | Performed by: STUDENT IN AN ORGANIZED HEALTH CARE EDUCATION/TRAINING PROGRAM

## 2022-01-01 PROCEDURE — 99999 PR PBB SHADOW E&M-EST. PATIENT-LVL V: CPT | Mod: PBBFAC,,, | Performed by: STUDENT IN AN ORGANIZED HEALTH CARE EDUCATION/TRAINING PROGRAM

## 2022-01-01 PROCEDURE — 3288F PR FALLS RISK ASSESSMENT DOCUMENTED: ICD-10-PCS | Mod: CPTII,S$GLB,, | Performed by: STUDENT IN AN ORGANIZED HEALTH CARE EDUCATION/TRAINING PROGRAM

## 2022-01-01 RX ORDER — SPIRONOLACTONE 100 MG/1
100 TABLET, FILM COATED ORAL DAILY
Qty: 90 TABLET | Refills: 3 | Status: SHIPPED | OUTPATIENT
Start: 2022-01-01 | End: 2022-01-01

## 2022-02-08 PROBLEM — I10 HYPERTENSION: Status: ACTIVE | Noted: 2022-01-01

## 2022-02-08 PROBLEM — I63.9 CEREBROVASCULAR ACCIDENT (CVA): Status: ACTIVE | Noted: 2022-01-01

## 2022-02-09 PROBLEM — E11.65 DIABETES MELLITUS WITH HYPERGLYCEMIA: Status: ACTIVE | Noted: 2022-01-01

## 2022-02-09 PROBLEM — I16.0 HYPERTENSIVE URGENCY: Status: ACTIVE | Noted: 2022-01-01

## 2022-03-14 PROBLEM — E11.3393 TYPE 2 DIABETES MELLITUS WITH BOTH EYES AFFECTED BY MODERATE NONPROLIFERATIVE RETINOPATHY WITHOUT MACULAR EDEMA, WITH LONG-TERM CURRENT USE OF INSULIN: Status: ACTIVE | Noted: 2022-01-01

## 2022-03-14 PROBLEM — Z79.4 TYPE 2 DIABETES MELLITUS WITH BOTH EYES AFFECTED BY MODERATE NONPROLIFERATIVE RETINOPATHY WITHOUT MACULAR EDEMA, WITH LONG-TERM CURRENT USE OF INSULIN: Status: ACTIVE | Noted: 2022-01-01

## 2022-04-20 PROBLEM — R19.7 DIARRHEA: Status: ACTIVE | Noted: 2022-01-01

## 2022-04-20 PROBLEM — R18.8 CIRRHOSIS OF LIVER WITH ASCITES: Status: ACTIVE | Noted: 2020-07-17

## 2022-05-26 PROBLEM — E03.9 HYPOTHYROIDISM: Status: ACTIVE | Noted: 2022-01-01

## 2022-05-26 PROBLEM — N04.9 NEPHROTIC SYNDROME: Status: ACTIVE | Noted: 2022-01-01

## 2022-06-02 NOTE — PROGRESS NOTES
Hepatology Follow Up Note    Referring provider: Dr. Teresa Payton  PCP: Osvaldo Mckeon Jr, MD    Chief complaint: follow up STREET cirrhosis    HPI:  Aleksandra Hagen is a 72 y.o. female with history of STREET related cirrhosis who presents for scheduled follow up.    6/2/22:  She was previously followed in hepatology clinic but was lost to follow-up due to insurance issues.  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  She says that 2-3 months ago she developed abdominal distension and was started on Lasix.  Despite this she had worsening abdominal distention and underwent paracentesis 3 weeks ago.  She has had recurrent abdominal distention and is scheduled for repeat paracentesis next week. She denies signs of decompensated cirrhosis including no recent encephalopathy, jaundice, GI bleeding.    Background (Saba Pierre, KRISTINA)  This is a 70 y.o. White female here for follow-up for cirrhosis, presumed due to STREET. Previously followed by Dr. Krause; new patient to me.   She is here today with .     Previous diagnosis of liver disease multiple years ago. At initial visit she was unsure of underlying cause though external notes report STREET cirrhosis. Liver biopsy reported from 2014 though no results for review.     Cirrhosis was confirmed with Fibroscan here 8/2019 = kPa 44.7 (F4 / cirrhosis).      Etiology suspected to be STREET and her risk factors include HTN, HLD, DM. DM had not been well controlled, HgbA1c 8-10 though no recent results for review.     Serologic workup negative for other causes of liver disease.   HCV Ab+, though RNA negative      Cirrhosis has been well compensated with low MELD.      Current symptoms of hepatic decompensation:              Ascites: mild on prior imaging              LE edema: mild swelling to R leg   *Diuretics - lasix 40 mg               Hepatic encephalopathy: denies              GI bleeding:  denies              Jaundice: no     Main concern is diarrhea which she has had for years. Reports negative GI workup. Takes lomotil PRN.     She has moved to [place] and changed insurance. Now restricted to providers within a certain radius from her home. Looking to establish care with new GI/hepatology provider, Endocrine, and PCP.    Past Medical History:   Diagnosis Date    Arthritis     Cirrhosis     CML (chronic myelocytic leukemia)     COPD (chronic obstructive pulmonary disease)     Diabetes mellitus, type 2     History of colon polyps     Hypertension     Lung cancer     Tooth loose 07/2021    top left       Past Surgical History:   Procedure Laterality Date    BACK SURGERY      CHOLECYSTECTOMY      COLONOSCOPY N/A 9/25/2020    Procedure: COLONOSCOPY;  Surgeon: Charly Pan MD;  Location: [facility];  Service: Endoscopy;  Laterality: N/A;    ENDOBRONCHIAL ULTRASOUND N/A 7/9/2021    Procedure: Linear ENDOBRONCHIAL ULTRASOUND (EBUS);  Surgeon: Edi Villa MD;  [facility];  Service: Pulmonary;  Laterality: N/A;  Req 8:30    ESOPHAGOGASTRODUODENOSCOPY N/A 9/25/2020    Procedure: EGD (ESOPHAGOGASTRODUODENOSCOPY);  Surgeon: Charly Pan MD;  Location: [facility];  Service: Endoscopy;  Laterality: N/A;    EYE SURGERY      HYSTERECTOMY      KNEE ARTHROSCOPY Right     KNEE SURGERY      PERITONEOCENTESIS N/A 4/28/2022    Procedure: PARACENTESIS, ABDOMINAL;  Surgeon: Chayo Diagnostic Provider;  [facility];  Service: General;  Laterality: N/A;    SHOULDER SURGERY Right     for spurs    TRIGGER FINGER RELEASE         Family History   Problem Relation Age of Onset    Heart disease Mother     No Known Problems Father     No Known Problems Sister     No Known Problems Brother     Cancer Maternal Grandmother         colon    Colon cancer Neg Hx     Esophageal cancer Neg Hx        Social History     Tobacco Use    Smoking status: Former Smoker     Packs/day: 0.50      Years: 51.00     Pack years: 25.50     Types: Cigarettes     Start date:      Quit date: 2021     Years since quittin.8    Smokeless tobacco: Never Used   Substance Use Topics    Alcohol use: Yes     Alcohol/week: 0.0 standard drinks     Comment: occasionally    Drug use: Never       Current Outpatient Medications   Medication Sig Dispense Refill    amLODIPine (NORVASC) 10 MG tablet Take 1 tablet (10 mg total) by mouth once daily. (Patient not taking: Reported on 2022) 30 tablet 11    blood-glucose meter kit Use as instructed. accucheck sabrina plus 1 each 0    busPIRone (BUSPAR) 15 MG tablet Take 15 mg by mouth 3 (three) times daily.      colestipoL (COLESTID) 1 gram Tab Take 1 tablet (1 g total) by mouth every evening. 30 tablet 11    ferrous sulfate 325 (65 FE) MG EC tablet Take by mouth once daily.      FREESTYLE OZZY 2 SENSOR Kit 1 each by Misc.(Non-Drug; Combo Route) route once daily. 6 kit 3    furosemide (LASIX) 40 MG tablet Take 1 tablet (40 mg total) by mouth daily as needed (fluid retention). 30 tablet 0    HYDROcodone-acetaminophen (NORCO) 5-325 mg per tablet       infusion set for insulin pump ISet 1 each by Misc.(Non-Drug; Combo Route) route Every 3 (three) days. 30 each 3    insulin lispro 100 unit/mL injection 60 units daily into insulin pump as directed. (Patient taking differently: Inject 120 Units into the skin nightly. 60 units daily into insulin pump as directed.) 54 mL 3    insulin pump syringe 3 mL Misc 1 each by Misc.(Non-Drug; Combo Route) route Every 3 (three) days. 30 each 3    IRON-VITAMIN B COMPLEX ORAL Take by mouth once daily.      levalbuterol (XOPENEX HFA) 45 mcg/actuation inhaler INHALE 2 PUFF USING INHALER THREE TIMES A DAY AS NEEDED      levothyroxine (SYNTHROID) 112 MCG tablet Take 1 tablet (112 mcg total) by mouth before breakfast. 90 tablet 3    LIDOcaine-prilocaine (EMLA) cream Apply topically as needed 30 minutes prior to port access.  "(Patient not taking: Reported on 5/26/2022) 30 g 0    magnesium oxide (MAG-OX) 400 mg (241.3 mg magnesium) tablet Take 400 mg by mouth once daily.      metoprolol succinate (TOPROL-XL) 50 MG 24 hr tablet Take 50 mg by mouth 2 (two) times daily.      metoprolol tartrate (LOPRESSOR) 50 MG tablet       multivitamin capsule Take 1 capsule by mouth once daily.      omeprazole (PRILOSEC) 40 MG capsule Take 40 mg by mouth once daily.      ondansetron (ZOFRAN) 8 MG tablet Take 8 mg by mouth every 8 (eight) hours as needed for Nausea.      pregabalin (LYRICA) 75 MG capsule Take 75 mg by mouth nightly.      traMADoL (ULTRAM) 50 mg tablet Take 50 mg by mouth every 6 (six) hours as needed.      UNABLE TO FIND Magic Mouthwash (before meal, every meal)      valsartan (DIOVAN) 320 MG tablet Take 1 tablet (320 mg total) by mouth once daily. (Patient taking differently: Take 320 mg by mouth every 12 (twelve) hours.) 90 tablet 3    vancomycin (VANCOCIN) 125 MG capsule Take 125 mg by mouth 4 (four) times daily.      vitamin D 1000 units Tab Take 2,000 Units by mouth 2 (two) times daily.       No current facility-administered medications for this visit.     Facility-Administered Medications Ordered in Other Visits   Medication Dose Route Frequency Provider Last Rate Last Admin    0.9%  NaCl infusion   Intravenous Continuous Charly Pan MD   Stopped at 09/25/20 0835       Review of patient's allergies indicates:   Allergen Reactions    Codeine Itching     burning       Review of Systems   Constitutional: Negative for fever and weight loss.   Cardiovascular: Negative for leg swelling.   Gastrointestinal: Negative for abdominal pain, blood in stool, constipation, diarrhea, heartburn, melena, nausea and vomiting.       Vitals:    06/02/22 1057   BP: (!) 148/72   Pulse: 79   Resp: 18   Temp: 98 °F (36.7 °C)   TempSrc: Oral   SpO2: 95%   Weight: 70.8 kg (156 lb 1.4 oz)   Height: 5' 2" (1.575 m)       Physical " Exam  Vitals reviewed.   Constitutional:       General: She is not in acute distress.  Eyes:      General: No scleral icterus.  Cardiovascular:      Rate and Rhythm: Normal rate and regular rhythm.   Pulmonary:      Effort: Pulmonary effort is normal. No respiratory distress.   Abdominal:      General: Bowel sounds are normal. There is distension.      Palpations: Abdomen is soft.      Tenderness: There is no abdominal tenderness. There is no guarding or rebound.   Musculoskeletal:      Right lower leg: No edema.      Left lower leg: No edema.   Skin:     Coloration: Skin is not jaundiced.         LABS: I personally reviewed pertinent laboratory findings.    Lab Results   Component Value Date    WBC 7.90 05/26/2022    HGB 12.0 05/26/2022    HCT 36.2 (L) 05/26/2022    MCV 95 05/26/2022     (L) 05/26/2022       Lab Results   Component Value Date     05/26/2022    K 3.8 05/26/2022     05/26/2022    CO2 30 (H) 05/26/2022    BUN 11 05/26/2022    CREATININE 0.94 05/26/2022    CALCIUM 8.9 05/26/2022    ANIONGAP 5 (L) 05/26/2022    ESTGFRAFRICA >60 05/26/2022    EGFRNONAA >60 05/26/2022       Lab Results   Component Value Date    ALT 16 05/26/2022    AST 48 (H) 05/26/2022    ALKPHOS 84 05/26/2022    BILITOT 0.9 05/26/2022       Lab Results   Component Value Date    HEPBCAB Positive (A) 08/12/2019       Lab Results   Component Value Date    SMOOTHMUSCAB Negative 1:40 08/12/2019    CERULOPLSM 30.0 08/12/2019        MELD-Na score: 7 at 7/9/2021  7:15 AM  MELD score: 7 at 7/9/2021  7:15 AM  Calculated from:  Serum Creatinine: 0.76 mg/dL (Using min of 1 mg/dL) at 7/9/2021  7:15 AM  Serum Sodium: 143 mmol/L (Using max of 137 mmol/L) at 7/9/2021  7:15 AM  Total Bilirubin: 0.9 mg/dL (Using min of 1 mg/dL) at 7/9/2021  7:15 AM  INR(ratio): 1.1 at 7/9/2021  7:15 AM  Age: 71 years     IMAGING: I personally reviewed imaging studies.    Assessment:  72 y.o. female with STREET related cirrhosis. MELD-Na 7. She is  decompensated with ascites.    1. Other ascites    2. Liver cirrhosis secondary to STREET        Recommendations:  1. Cirrhosis due to STREET: Counseled on diet, weight loss, and control of co-morbidities.    2. Ascites/Edema: 2 gm Na diet.  Continue Lasix 40 mg daily.  Start spironolactone 100 mg daily.  Repeat labs in 1-2 weeks.  LVP as needed.    3. Encephalopathy: Not an active issue    4. Transplant candidacy: Transplant evaluation not warranted given low MELD. Repeating MELD labs today. Her lung cancer is also a barrier to transplant at this time.    Cirrhosis HM  - HCC screening: CT in 04/2022 without HCC. Check AFP. Repeat abdominal US and AFP every 6 months.    - Variceal screening: EGD in 09/2020 showed no varices. Repeat EGD with her local gastroenterologist, Dr. Pan.    - Immunizations: Recommend HAV and HBV vaccinations if not immune.    Return to clinic in 2 months with labs.    I spent a total of 40 minutes on the day of the visit. This includes face to face time and non-face to face time preparing to see the patient (eg, review of tests), obtaining and/or reviewing separately obtained history, documenting clinical information in the electronic or other health record, independently interpreting results, and communicating results to the patient/family/caregiver, or care coordination.    Low Robles MD  Staff Physician  Hepatology and Liver Transplant  [facility]  [facility] [place]

## 2022-06-30 PROBLEM — E11.9 DIABETES: Status: ACTIVE | Noted: 2022-01-01

## 2022-06-30 PROBLEM — N17.9 AKI (ACUTE KIDNEY INJURY): Status: ACTIVE | Noted: 2022-01-01

## 2022-06-30 PROBLEM — E87.5 HYPERKALEMIA: Status: ACTIVE | Noted: 2022-01-01

## 2022-06-30 PROBLEM — Z71.89 ENCOUNTER TO DISCUSS TREATMENT OPTIONS: Status: ACTIVE | Noted: 2022-01-01

## 2022-06-30 PROBLEM — A49.8 CLOSTRIDIUM DIFFICILE INFECTION: Status: ACTIVE | Noted: 2022-01-01

## 2022-06-30 NOTE — TELEPHONE ENCOUNTER
----- Message from Dr Low Robles ------  Please call patient and have her to go to the ED for a potassium of 6.4.    Call placed to the patient and message relayed from Dr Robles.    Patient stated I just got in from the Dr. I am not going to go and sit in an ER to be waiting to be seen.  Dr Robles feels the numbers are high enough for you to seek tx in the ED. Going to the ER in not an option.    Have you gotten the Rx for the Kayexalate to take and repeat labs tomorrow?  No, I just got home. I have not done that yet.  I will pick that up. ER is not an option.    I will let Dr Robles know.

## 2022-07-13 NOTE — TELEPHONE ENCOUNTER
----- Message from Low Robles MD sent at 7/12/2022  8:21 PM CDT -----  Please let Ms. Hagen know that her liver tests are stable. Her MELD score is 9 which is also stable. I sent her a message in the portal, but it doesn't look like she's read it.

## 2022-08-20 PROBLEM — K92.2 UPPER GI BLEEDING: Status: ACTIVE | Noted: 2022-01-01

## 2022-08-21 PROBLEM — J96.01 ACUTE HYPOXEMIC RESPIRATORY FAILURE: Status: ACTIVE | Noted: 2022-01-01

## 2022-08-25 PROBLEM — K76.82 HEPATIC ENCEPHALOPATHY: Status: ACTIVE | Noted: 2022-01-01

## 2022-08-25 PROBLEM — I81 PORTAL VEIN THROMBOSIS: Status: ACTIVE | Noted: 2022-01-01

## 2022-08-26 PROBLEM — E63.9 INADEQUATE DIETARY ENERGY INTAKE: Status: ACTIVE | Noted: 2022-01-01

## 2022-08-26 PROBLEM — R53.1 WEAKNESS: Status: ACTIVE | Noted: 2022-01-01

## 2022-09-09 PROBLEM — R18.8 OTHER ASCITES: Status: ACTIVE | Noted: 2022-01-01
